# Patient Record
Sex: MALE | Race: WHITE | NOT HISPANIC OR LATINO | Employment: FULL TIME | URBAN - METROPOLITAN AREA
[De-identification: names, ages, dates, MRNs, and addresses within clinical notes are randomized per-mention and may not be internally consistent; named-entity substitution may affect disease eponyms.]

---

## 2017-02-23 ENCOUNTER — GENERIC CONVERSION - ENCOUNTER (OUTPATIENT)
Dept: OTHER | Facility: OTHER | Age: 47
End: 2017-02-23

## 2017-02-24 RX ORDER — ACETAMINOPHEN 325 MG/1
975 TABLET ORAL ONCE
Status: DISCONTINUED | OUTPATIENT
Start: 2017-02-27 | End: 2017-03-02 | Stop reason: HOSPADM

## 2017-02-24 RX ORDER — SODIUM CHLORIDE 9 MG/ML
20 INJECTION, SOLUTION INTRAVENOUS CONTINUOUS
Status: DISCONTINUED | OUTPATIENT
Start: 2017-02-27 | End: 2017-03-02 | Stop reason: HOSPADM

## 2017-02-24 RX ORDER — DIPHENHYDRAMINE HCL 25 MG
25 TABLET ORAL ONCE
Status: DISCONTINUED | OUTPATIENT
Start: 2017-02-27 | End: 2017-03-02 | Stop reason: HOSPADM

## 2017-02-27 ENCOUNTER — HOSPITAL ENCOUNTER (OUTPATIENT)
Dept: INFUSION CENTER | Facility: CLINIC | Age: 47
Discharge: HOME | End: 2017-02-27
Payer: COMMERCIAL

## 2017-02-27 VITALS
HEART RATE: 80 BPM | SYSTOLIC BLOOD PRESSURE: 130 MMHG | TEMPERATURE: 96.5 F | DIASTOLIC BLOOD PRESSURE: 90 MMHG | RESPIRATION RATE: 18 BRPM

## 2017-02-27 PROCEDURE — 96413 CHEMO IV INFUSION 1 HR: CPT

## 2017-02-27 RX ADMIN — SODIUM CHLORIDE 20 ML/HR: 0.9 INJECTION, SOLUTION INTRAVENOUS at 09:05

## 2017-02-27 NOTE — PROGRESS NOTES
Patient tolerated Entyvio without complications  Next appointment made before d/c   Patient declined AVS

## 2017-03-07 ENCOUNTER — ALLSCRIPTS OFFICE VISIT (OUTPATIENT)
Dept: OTHER | Facility: OTHER | Age: 47
End: 2017-03-07

## 2017-03-07 DIAGNOSIS — K51.30 CHRONIC ULCERATIVE RECTOSIGMOIDITIS WITHOUT COMPLICATIONS (HCC): ICD-10-CM

## 2017-04-21 RX ORDER — SODIUM CHLORIDE 9 MG/ML
20 INJECTION, SOLUTION INTRAVENOUS CONTINUOUS
Status: DISCONTINUED | OUTPATIENT
Start: 2017-04-24 | End: 2017-04-27 | Stop reason: HOSPADM

## 2017-04-21 RX ORDER — ACETAMINOPHEN 325 MG/1
975 TABLET ORAL ONCE
Status: DISCONTINUED | OUTPATIENT
Start: 2017-04-24 | End: 2017-04-27 | Stop reason: HOSPADM

## 2017-04-21 RX ORDER — DIPHENHYDRAMINE HCL 25 MG
25 TABLET ORAL ONCE
Status: DISCONTINUED | OUTPATIENT
Start: 2017-04-24 | End: 2017-04-27 | Stop reason: HOSPADM

## 2017-04-24 ENCOUNTER — HOSPITAL ENCOUNTER (OUTPATIENT)
Dept: INFUSION CENTER | Facility: CLINIC | Age: 47
Discharge: HOME | End: 2017-04-24
Payer: COMMERCIAL

## 2017-04-24 VITALS — HEART RATE: 72 BPM | SYSTOLIC BLOOD PRESSURE: 137 MMHG | DIASTOLIC BLOOD PRESSURE: 80 MMHG

## 2017-04-24 PROCEDURE — 96413 CHEMO IV INFUSION 1 HR: CPT

## 2017-04-24 RX ADMIN — SODIUM CHLORIDE 20 ML/HR: 0.9 INJECTION, SOLUTION INTRAVENOUS at 09:48

## 2017-04-24 NOTE — PLAN OF CARE

## 2017-06-09 ENCOUNTER — GENERIC CONVERSION - ENCOUNTER (OUTPATIENT)
Dept: OTHER | Facility: OTHER | Age: 47
End: 2017-06-09

## 2017-06-16 RX ORDER — ACETAMINOPHEN 325 MG/1
975 TABLET ORAL ONCE
Status: DISCONTINUED | OUTPATIENT
Start: 2017-06-19 | End: 2017-06-22 | Stop reason: HOSPADM

## 2017-06-16 RX ORDER — SODIUM CHLORIDE 9 MG/ML
20 INJECTION, SOLUTION INTRAVENOUS CONTINUOUS
Status: DISCONTINUED | OUTPATIENT
Start: 2017-06-19 | End: 2017-06-22 | Stop reason: HOSPADM

## 2017-06-16 RX ORDER — DIPHENHYDRAMINE HCL 25 MG
25 TABLET ORAL ONCE
Status: DISCONTINUED | OUTPATIENT
Start: 2017-06-19 | End: 2017-06-22 | Stop reason: HOSPADM

## 2017-06-19 ENCOUNTER — HOSPITAL ENCOUNTER (OUTPATIENT)
Dept: INFUSION CENTER | Facility: CLINIC | Age: 47
Discharge: HOME | End: 2017-06-19
Payer: COMMERCIAL

## 2017-06-19 VITALS
SYSTOLIC BLOOD PRESSURE: 129 MMHG | DIASTOLIC BLOOD PRESSURE: 79 MMHG | RESPIRATION RATE: 18 BRPM | HEART RATE: 74 BPM | TEMPERATURE: 97.6 F

## 2017-06-19 PROCEDURE — 96413 CHEMO IV INFUSION 1 HR: CPT

## 2017-06-19 RX ADMIN — SODIUM CHLORIDE 20 ML/HR: 0.9 INJECTION, SOLUTION INTRAVENOUS at 10:14

## 2017-06-19 NOTE — PLAN OF CARE
Problem: Potential for Falls  Goal: Patient will remain free of falls  INTERVENTIONS:  - Assess patient frequently for physical needs  -  Identify cognitive and physical deficits and behaviors that affect risk of falls    -  [place] fall precautions as indicated by assessment   - Educate patient/family on patient safety including physical limitations  - Instruct patient to call for assistance with activity based on assessment  - Modify environment to reduce risk of injury  - Consider OT/PT consult to assist with strengthening/mobility   Outcome: Progressing

## 2017-08-11 RX ORDER — DIPHENHYDRAMINE HCL 25 MG
25 TABLET ORAL ONCE
Status: DISCONTINUED | OUTPATIENT
Start: 2017-08-14 | End: 2017-08-17 | Stop reason: HOSPADM

## 2017-08-11 RX ORDER — ACETAMINOPHEN 325 MG/1
975 TABLET ORAL ONCE
Status: DISCONTINUED | OUTPATIENT
Start: 2017-08-14 | End: 2017-08-17 | Stop reason: HOSPADM

## 2017-08-11 RX ORDER — SODIUM CHLORIDE 9 MG/ML
20 INJECTION, SOLUTION INTRAVENOUS CONTINUOUS
Status: DISCONTINUED | OUTPATIENT
Start: 2017-08-14 | End: 2017-08-17 | Stop reason: HOSPADM

## 2017-08-14 ENCOUNTER — HOSPITAL ENCOUNTER (OUTPATIENT)
Dept: INFUSION CENTER | Facility: CLINIC | Age: 47
Discharge: HOME | End: 2017-08-14
Payer: COMMERCIAL

## 2017-08-14 VITALS
HEART RATE: 73 BPM | DIASTOLIC BLOOD PRESSURE: 87 MMHG | TEMPERATURE: 97.1 F | RESPIRATION RATE: 16 BRPM | SYSTOLIC BLOOD PRESSURE: 123 MMHG

## 2017-08-14 PROCEDURE — 96413 CHEMO IV INFUSION 1 HR: CPT

## 2017-08-14 RX ADMIN — SODIUM CHLORIDE 20 ML/HR: 0.9 INJECTION, SOLUTION INTRAVENOUS at 10:00

## 2017-08-14 NOTE — PROGRESS NOTES
Pt comes to infusion center for entyvio infusion  Pt tolerated infusion well   Pt aware of future appointments

## 2017-09-08 ENCOUNTER — ALLSCRIPTS OFFICE VISIT (OUTPATIENT)
Dept: OTHER | Facility: OTHER | Age: 47
End: 2017-09-08

## 2017-09-11 ENCOUNTER — HOSPITAL ENCOUNTER (OUTPATIENT)
Facility: HOSPITAL | Age: 47
Setting detail: OUTPATIENT SURGERY
Discharge: HOME | End: 2017-09-11
Attending: INTERNAL MEDICINE | Admitting: INTERNAL MEDICINE
Payer: COMMERCIAL

## 2017-09-11 ENCOUNTER — GENERIC CONVERSION - ENCOUNTER (OUTPATIENT)
Dept: OTHER | Facility: OTHER | Age: 47
End: 2017-09-11

## 2017-09-11 ENCOUNTER — ANESTHESIA EVENT (OUTPATIENT)
Dept: GASTROENTEROLOGY | Facility: HOSPITAL | Age: 47
End: 2017-09-11
Payer: COMMERCIAL

## 2017-09-11 ENCOUNTER — ANESTHESIA (OUTPATIENT)
Dept: GASTROENTEROLOGY | Facility: HOSPITAL | Age: 47
End: 2017-09-11
Payer: COMMERCIAL

## 2017-09-11 VITALS
HEIGHT: 70 IN | BODY MASS INDEX: 37.94 KG/M2 | HEART RATE: 81 BPM | RESPIRATION RATE: 16 BRPM | OXYGEN SATURATION: 99 % | WEIGHT: 265 LBS | DIASTOLIC BLOOD PRESSURE: 61 MMHG | TEMPERATURE: 97.5 F | SYSTOLIC BLOOD PRESSURE: 114 MMHG

## 2017-09-11 PROCEDURE — 88305 TISSUE EXAM BY PATHOLOGIST: CPT | Performed by: INTERNAL MEDICINE

## 2017-09-11 PROCEDURE — 88342 IMHCHEM/IMCYTCHM 1ST ANTB: CPT | Performed by: INTERNAL MEDICINE

## 2017-09-11 RX ORDER — PROPOFOL 10 MG/ML
INJECTION, EMULSION INTRAVENOUS AS NEEDED
Status: DISCONTINUED | OUTPATIENT
Start: 2017-09-11 | End: 2017-09-11 | Stop reason: SURG

## 2017-09-11 RX ORDER — SODIUM CHLORIDE 9 MG/ML
50 INJECTION, SOLUTION INTRAVENOUS CONTINUOUS
Status: DISCONTINUED | OUTPATIENT
Start: 2017-09-11 | End: 2017-09-11 | Stop reason: HOSPADM

## 2017-09-11 RX ORDER — PROPOFOL 10 MG/ML
INJECTION, EMULSION INTRAVENOUS CONTINUOUS PRN
Status: DISCONTINUED | OUTPATIENT
Start: 2017-09-11 | End: 2017-09-11 | Stop reason: SURG

## 2017-09-11 RX ADMIN — PROPOFOL 100 MG: 10 INJECTION, EMULSION INTRAVENOUS at 13:36

## 2017-09-11 RX ADMIN — PROPOFOL 100 MG: 10 INJECTION, EMULSION INTRAVENOUS at 13:40

## 2017-09-11 RX ADMIN — SODIUM CHLORIDE 50 ML/HR: 0.9 INJECTION, SOLUTION INTRAVENOUS at 13:31

## 2017-09-11 RX ADMIN — PROPOFOL 100 MG: 10 INJECTION, EMULSION INTRAVENOUS at 13:45

## 2017-09-11 RX ADMIN — PROPOFOL 150 MCG/KG/MIN: 10 INJECTION, EMULSION INTRAVENOUS at 13:37

## 2017-09-11 RX ADMIN — PROPOFOL 100 MG: 10 INJECTION, EMULSION INTRAVENOUS at 13:57

## 2017-09-13 ENCOUNTER — GENERIC CONVERSION - ENCOUNTER (OUTPATIENT)
Dept: OTHER | Facility: OTHER | Age: 47
End: 2017-09-13

## 2017-10-02 ENCOUNTER — GENERIC CONVERSION - ENCOUNTER (OUTPATIENT)
Dept: OTHER | Facility: OTHER | Age: 47
End: 2017-10-02

## 2017-10-06 RX ORDER — ACETAMINOPHEN 325 MG/1
975 TABLET ORAL ONCE
Status: DISCONTINUED | OUTPATIENT
Start: 2017-10-09 | End: 2017-10-12 | Stop reason: HOSPADM

## 2017-10-06 RX ORDER — DIPHENHYDRAMINE HCL 25 MG
25 TABLET ORAL ONCE
Status: DISCONTINUED | OUTPATIENT
Start: 2017-10-09 | End: 2017-10-12 | Stop reason: HOSPADM

## 2017-10-06 RX ORDER — SODIUM CHLORIDE 9 MG/ML
20 INJECTION, SOLUTION INTRAVENOUS CONTINUOUS
Status: DISCONTINUED | OUTPATIENT
Start: 2017-10-09 | End: 2017-10-12 | Stop reason: HOSPADM

## 2017-10-09 ENCOUNTER — HOSPITAL ENCOUNTER (OUTPATIENT)
Dept: INFUSION CENTER | Facility: CLINIC | Age: 47
Discharge: HOME | End: 2017-10-09
Payer: COMMERCIAL

## 2017-10-10 RX ORDER — ACETAMINOPHEN 325 MG/1
975 TABLET ORAL ONCE
Status: DISCONTINUED | OUTPATIENT
Start: 2017-10-11 | End: 2017-10-14 | Stop reason: HOSPADM

## 2017-10-10 RX ORDER — DIPHENHYDRAMINE HCL 25 MG
25 TABLET ORAL ONCE
Status: DISCONTINUED | OUTPATIENT
Start: 2017-10-11 | End: 2017-10-14 | Stop reason: HOSPADM

## 2017-10-11 ENCOUNTER — HOSPITAL ENCOUNTER (OUTPATIENT)
Dept: INFUSION CENTER | Facility: CLINIC | Age: 47
Discharge: HOME | End: 2017-10-11
Payer: COMMERCIAL

## 2017-10-11 VITALS
TEMPERATURE: 96.7 F | HEART RATE: 85 BPM | RESPIRATION RATE: 18 BRPM | DIASTOLIC BLOOD PRESSURE: 80 MMHG | SYSTOLIC BLOOD PRESSURE: 149 MMHG

## 2017-10-11 PROCEDURE — 96413 CHEMO IV INFUSION 1 HR: CPT

## 2017-10-11 NOTE — PROGRESS NOTES
Pt received treatment without complications  Discharged in stable condition  Next appointment scheduled in 8 weeks  AVS provided

## 2017-10-11 NOTE — PLAN OF CARE
Problem: Potential for Falls  Goal: Patient will remain free of falls  INTERVENTIONS:  - Assess patient frequently for physical needs  -  Identify cognitive and physical deficits and behaviors that affect risk of falls    -  [place] fall precautions as indicated by assessment   - Educate patient/family on patient safety including physical limitations  - Instruct patient to call for assistance with activity based on assessment  - Modify environment to reduce risk of injury  - Consider OT/PT consult to assist with strengthening/mobility   Outcome: Progressing

## 2017-12-06 ENCOUNTER — HOSPITAL ENCOUNTER (OUTPATIENT)
Dept: INFUSION CENTER | Facility: CLINIC | Age: 47
Discharge: HOME | End: 2017-12-06
Payer: COMMERCIAL

## 2017-12-06 VITALS
RESPIRATION RATE: 18 BRPM | HEART RATE: 86 BPM | DIASTOLIC BLOOD PRESSURE: 83 MMHG | TEMPERATURE: 97.8 F | SYSTOLIC BLOOD PRESSURE: 147 MMHG

## 2017-12-06 PROCEDURE — 96375 TX/PRO/DX INJ NEW DRUG ADDON: CPT

## 2017-12-06 PROCEDURE — 96413 CHEMO IV INFUSION 1 HR: CPT

## 2017-12-06 RX ORDER — DIPHENHYDRAMINE HCL 25 MG
25 TABLET ORAL ONCE
Status: DISCONTINUED | OUTPATIENT
Start: 2017-12-06 | End: 2017-12-09 | Stop reason: HOSPADM

## 2017-12-06 RX ORDER — METHYLPREDNISOLONE SODIUM SUCCINATE 40 MG/ML
40 INJECTION, POWDER, LYOPHILIZED, FOR SOLUTION INTRAMUSCULAR; INTRAVENOUS ONCE
Status: COMPLETED | OUTPATIENT
Start: 2017-12-06 | End: 2017-12-06

## 2017-12-06 RX ORDER — SODIUM CHLORIDE 9 MG/ML
20 INJECTION, SOLUTION INTRAVENOUS CONTINUOUS
Status: DISCONTINUED | OUTPATIENT
Start: 2017-12-06 | End: 2017-12-09 | Stop reason: HOSPADM

## 2017-12-06 RX ORDER — ACETAMINOPHEN 325 MG/1
975 TABLET ORAL ONCE
Status: DISCONTINUED | OUTPATIENT
Start: 2017-12-06 | End: 2017-12-09 | Stop reason: HOSPADM

## 2017-12-06 RX ADMIN — SODIUM CHLORIDE 20 ML/HR: 0.9 INJECTION, SOLUTION INTRAVENOUS at 14:43

## 2017-12-06 RX ADMIN — METHYLPREDNISOLONE SODIUM SUCCINATE 40 MG: 40 INJECTION, POWDER, FOR SOLUTION INTRAMUSCULAR; INTRAVENOUS at 14:46

## 2017-12-06 NOTE — PLAN OF CARE
Problem: Potential for Falls  Goal: Patient will remain free of falls  INTERVENTIONS:  - Assess patient frequently for physical needs  -  Identify cognitive and physical deficits and behaviors that affect risk of falls    -  [place] fall precautions as indicated by assessment   - Educate patient/family on patient safety including physical limitations  - Instruct patient to call for assistance with activity based on assessment  - Modify environment to reduce risk of injury  - Consider OT/PT consult to assist with strengthening/mobility   Outcome: Progressing

## 2017-12-06 NOTE — PROGRESS NOTES
Patient denied any recent illnesses, surgeries, hospitalizations  Patient tolerated treatment well without any complications  Next appt made and AVS given

## 2017-12-19 ENCOUNTER — GENERIC CONVERSION - ENCOUNTER (OUTPATIENT)
Dept: OTHER | Facility: OTHER | Age: 47
End: 2017-12-19

## 2018-01-05 ENCOUNTER — GENERIC CONVERSION - ENCOUNTER (OUTPATIENT)
Dept: GASTROENTEROLOGY | Facility: CLINIC | Age: 48
End: 2018-01-05

## 2018-01-05 ENCOUNTER — GENERIC CONVERSION - ENCOUNTER (OUTPATIENT)
Dept: OTHER | Facility: OTHER | Age: 48
End: 2018-01-05

## 2018-01-09 ENCOUNTER — GENERIC CONVERSION - ENCOUNTER (OUTPATIENT)
Dept: GASTROENTEROLOGY | Facility: CLINIC | Age: 48
End: 2018-01-09

## 2018-01-11 NOTE — RESULT NOTES
Verified Results  (1) TISSUE EXAM 04Apr2016 01:21PM Deloris Herbert     Test Name Result Flag Reference   LAB AP CASE REPORT (Report)     Surgical Pathology Report             Case: [id number]                   Authorizing Provider: Yinka Mcneil PA-C    Collected:      04/04/2016 1321        Ordering Location:   [facility]    Received:      04/04/2016 [address]  Endoscopy                              Pathologist:      Karli Murillo MD                                Specimens:  A) - Small Bowel, NOS, random bx terminal ileum  pt has ulcerative colitis               B) - Large Intestine, Right/Ascending Colon, bx ascending colon  Pt has ulcerative           colitis                                                C) - Rectum, rectal bx   LAB AP FINAL DIAGNOSIS (Report)     A  Small Bowel, NOS, random bx terminal ileum  biopsy:   -Benign small intestinal mucosa with intact villi and intramucosal   lymphoid follicle formation and mild chronic inflammation  -No active inflammation or granuloma seen   -No evidence of dysplasia or malignancy  B  Large Intestine, Right/Ascending Colon, bx ascending colon  Pt has   ulcerative colitis:  -Benign colonic mucosa with edema of lamina propria and focal crypt   architecture distortion/branching    -No evidence of active colitis    -No evidence of dysplasia or malignancy seen    C  Rectum, rectal biopsy:    -Chronic severe active proctitis, with cryptitis, crypt abscess, crypt   architecture distortion/ branching and mucosal ulceration  -No evidence of dysplasia or malignancy seen  LAB AP SURGICAL ADDITIONAL INFORMATION (Report)     These tests were developed and their performance characteristics   determined by [address] [place] [address] [facility] or [facility]  They may not be cleared or approved by the [place]  Food and   Drug Administration  The FDA has determined that such clearance or   approval is not necessary   These tests are used for clinical purposes  They should not be regarded as investigational or for research  This   laboratory has been approved by IA 88, designated as a high-complexity   laboratory and is qualified to perform these tests  LAB AP GROSS DESCRIPTION (Report)     A  The specimen is received in formalin, labeled with the patient's name   and hospital number, and is designated random biopsy terminal ileum  The specimen consists of 2 tan soft tissue fragments measuring 0 3 cm   each  Entirely submitted  One cassette  B  The specimen is received in formalin, labeled with the patient's name   and hospital number, and is designated biopsy of ascending colon  The   specimen consists of 4 tan soft tissue fragments each measuring 0 3 cm  Entirely submitted  One cassette  Note: The estimated total formalin fixation time based upon information   provided by the submitting clinician and the standard processing schedule   is 24 75 hours  C  The specimen is received in formalin, labeled with the patient's name   and hospital number, and is designated rectal biopsy  The specimen   consists of 3 tan soft tissue fragments each measuring 0 2-0 3 cm  Entirely submitted  One cassette  Note: The estimated total formalin fixation time based upon information   provided by the submitting clinician and the standard processing schedule   is 24 5 hours      MAC

## 2018-01-11 NOTE — MISCELLANEOUS
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)    Current Meds   1  Entyvio 300 MG Intravenous Solution Reconstituted; 300mg IV on weeks 0,2,6 then q 8   weeks; Therapy: 29Odl6314 to (Last Rx:12Qzr9488) Ordered   2  Entyvio 300 MG Intravenous Solution Reconstituted; Use as directed with infusions at 0,2,   and 6 weeks, and then once every 8 weeks after that; Therapy: 24ZDF9031 to (Last Rx:56Bzg9282)  Requested for: 22SYE8894 Ordered   3  Hydrocortisone 100 MG/60ML Rectal Enema (Cortenema); TAKE RECTALLY ONCE   DAILY AT BEDTIME; Therapy: 64Egd7017 to (Last Rx:80Pdj0184)  Requested for: 51Iuz6506 Ordered   4  Lialda 1 2 GM Oral Tablet Delayed Release; TAKE 4 TABLETS ONCE DAILY WITH THE   EVENING MEAL; Therapy: 82Iri9128 to (Evaluate:06Apr2017); Last Rx:95Hag4787 Ordered   5  PredniSONE 10 MG Oral Tablet; TAKE 4 TABLETS DAILY; Therapy: 62Eyy1340 to (Evaluate:10Jun2016)  Requested for: 82Jtl9336; Last   Rx:82Tqm6485 Ordered   6  PredniSONE 5 MG Oral Tablet; Therapy: (Recorded:30Mos4963) to Recorded    Allergies    1   No Known Drug Allergies    Signatures   Electronically signed by : Rosario Lopez, ; May 23 2016  1:27PM EST                       (Author)

## 2018-01-13 VITALS
BODY MASS INDEX: 36.68 KG/M2 | HEART RATE: 88 BPM | DIASTOLIC BLOOD PRESSURE: 76 MMHG | WEIGHT: 262 LBS | SYSTOLIC BLOOD PRESSURE: 142 MMHG | HEIGHT: 71 IN | TEMPERATURE: 95.7 F | OXYGEN SATURATION: 97 %

## 2018-01-13 VITALS
OXYGEN SATURATION: 97 % | BODY MASS INDEX: 37.1 KG/M2 | WEIGHT: 265 LBS | DIASTOLIC BLOOD PRESSURE: 68 MMHG | HEART RATE: 79 BPM | TEMPERATURE: 96.2 F | SYSTOLIC BLOOD PRESSURE: 118 MMHG | HEIGHT: 71 IN

## 2018-01-16 NOTE — RESULT NOTES
Discussion/Summary   Biopsies from stomach, duodenum, terminal ileum, and ascending colon were normal  Biopsies from the rectum were same as before (moderate to severe ulcerative colitis)  Verified Results  (1) TISSUE EXAM 11Sep2017 01:46PM Julian Her     Test Name Result Flag Reference   LAB AP CASE REPORT (Report)     Surgical Pathology Report             Case: [id number]                   Authorizing Provider: Vel Malik MD      Collected:      09/11/2017 1346        Ordering Location:   [address] [place] [address]   Received:      09/11/2017 [address] [facility] Endoscopy                               Pathologist:      Che Escoto DO                               Specimens:  A) - Duodenum, R/O celiac                                       B) - Stomach, r/o H pylori                                       C) - Intestine, Random terminal ileum  evaluation of UC                        D) - Large Intestine, Right/Ascending Colon, Evaluation of UC  Normal appearance            E) - Rectum, Ulcerative colitis   LAB AP FINAL DIAGNOSIS (Report)     A  Duodenum, biopsy:  - Duodenal mucosa with no significant pathologic abnormalities  - No villous atrophy or increased intraepithelial lymphocytes identified  B  Stomach, biopsy:  - Minimal chronic inactive antral and oxyntic gastritis  - No H  pylori organisms identified on H&E and immunohistochemical stains  C  Terminal ileum, random, biopsy:  - Ileal mucosa with no significant pathologic abnormality   - No granulomata or dysplasia identified  D  Colon, ascending, biopsy:  - Benign colonic mucosa with submucosal lymphoid follicle  - No changes of chronic, active or microscopic colitis identified    - No dysplasia identified  E  Rectum, ulcerative colitis, biopsy:  - Chronic proctitis with moderate to severe activity, with ulceration, see   note      Note: Ulcerated fragments of acutely inflamed colonic mucosa with acute   cryptitis, acute crypt abscess, increased plasmacytic and neutrophilic   infiltrate of the lamina propria and mild to moderate glandular disarray  Separate fragments of acutely inflamed granulation tissue present  No   granulomata or dysplasia identified  Appropriate positive and negative controls obtained  Interpretation performed at [facility], [place] [address]  Electronically signed by Queta Uribe DO on 9/12/2017 at 1:33 PM   LAB AP SURGICAL ADDITIONAL INFORMATION (Report)     All controls performed with the immunohistochemical stains reported above   reacted appropriately  These tests were developed and their performance   characteristics determined by [facility] or   [facility]  They may not be cleared or approved by the [place]  Food and Drug Administration  The FDA has determined that such clearance   or approval is not necessary  These tests are used for clinical purposes  They should not be regarded as investigational or for research  This   laboratory has been approved by CLIA 88, designated as a high-complexity   laboratory and is qualified to perform these tests  LAB AP GROSS DESCRIPTION (Report)     A  The specimen is received in formalin, labeled with the patient's name   and hospital number, and is designated duodenum rule out celiac  The   specimen consists of two, rubbery, tan-brown tissue fragments each   measuring up to 0 3 cm in greatest dimension  Entirely submitted  One   cassette  B  The specimen is received in formalin, labeled with the patient's name   and hospital number, and is designated stomach rule out H  pylori  The   specimen consists of two, rubbery, tan-brown tissue fragments each   measuring up to 0 3 cm in greatest dimension  Entirely submitted  One   cassette  C  The specimen is received in formalin, labeled with the patient's name   and hospital number, and is designated random terminal ileum, evaluation   of UC  The specimen consists of a single, rubbery, tan-brown tissue   fragment measuring up to 0 4 cm in greatest dimension  Entirely submitted  One cassette  D  The specimen is received in formalin, labeled with the patient's name   and hospital number, and is designated Ascending colon evaluation of   UC  The specimen consists of two, rubbery, tan-brown tissue fragments   each measuring up to 0 3 cm in greatest dimension  Entirely submitted  One   cassette  E  The specimen is received in formalin, labeled with the patient's name   and hospital number, and is designated rectum ulcerative colitis  The   specimen consists of several, rubbery, tan-brown tissue fragments   measuring 1 2 x 0 9 x 0 1 cm in aggregate dimension  Entirely submitted  One cassette  Note: The estimated total formalin fixation time based upon information   provided by the submitting clinician and the standard processing schedule   is less than 72 hours    Jose Donnelly

## 2018-01-17 NOTE — MISCELLANEOUS
Message  GI Reminder Recall [facility]:   Date: 06/09/2017   Dear Toni Lamas:     Review of our records shows you are due for the following: Follow Up Visit  Please call the following office to schedule your appointment:   [address], [address], [place], [place], [address] [facility] [phone]  We look forward to hearing from you! Sincerely,     GI Team      Signatures   Electronically signed by :  Trixie Karimi, ; Jun 9 2017  9:37AM EST                       (Author)

## 2018-01-22 VITALS
BODY MASS INDEX: 37.66 KG/M2 | HEART RATE: 74 BPM | SYSTOLIC BLOOD PRESSURE: 120 MMHG | OXYGEN SATURATION: 97 % | WEIGHT: 269 LBS | HEIGHT: 71 IN | TEMPERATURE: 97.3 F | DIASTOLIC BLOOD PRESSURE: 80 MMHG

## 2018-01-24 ENCOUNTER — TELEPHONE (OUTPATIENT)
Dept: GASTROENTEROLOGY | Facility: MEDICAL CENTER | Age: 48
End: 2018-01-24

## 2018-01-26 ENCOUNTER — TELEPHONE (OUTPATIENT)
Dept: GASTROENTEROLOGY | Facility: CLINIC | Age: 48
End: 2018-01-26

## 2018-01-29 ENCOUNTER — TELEPHONE (OUTPATIENT)
Dept: GASTROENTEROLOGY | Facility: MEDICAL CENTER | Age: 48
End: 2018-01-29

## 2018-01-29 DIAGNOSIS — K51.319 ULCERATIVE PROCTOSIGMOIDITIS, UNSPECIFIED COMPLICATION (HCC): Primary | ICD-10-CM

## 2018-01-29 RX ORDER — VEDOLIZUMAB 300 MG/5ML
INJECTION, POWDER, LYOPHILIZED, FOR SOLUTION INTRAVENOUS
Qty: 1 EACH | Refills: 5 | Status: SHIPPED | OUTPATIENT
Start: 2018-01-29 | End: 2018-02-02 | Stop reason: DRUGHIGH

## 2018-01-29 NOTE — TELEPHONE ENCOUNTER
I have a written physician order at the [facility] office for Dr Keyonna Albrecht to sign, once he signs it I will send it to the [facility] [phone]

## 2018-01-29 NOTE — TELEPHONE ENCOUNTER
Called [phone], provided a verbal for Ozzy with Josiane the pharmacist  She marked the medication as urgent to ship

## 2018-01-30 RX ORDER — DIPHENHYDRAMINE HCL 25 MG
25 TABLET ORAL ONCE
Status: DISCONTINUED | OUTPATIENT
Start: 2018-01-31 | End: 2018-02-03 | Stop reason: HOSPADM

## 2018-01-30 RX ORDER — SODIUM CHLORIDE 9 MG/ML
20 INJECTION, SOLUTION INTRAVENOUS CONTINUOUS
Status: DISCONTINUED | OUTPATIENT
Start: 2018-01-31 | End: 2018-02-03 | Stop reason: HOSPADM

## 2018-01-30 RX ORDER — ACETAMINOPHEN 325 MG/1
975 TABLET ORAL ONCE
Status: DISCONTINUED | OUTPATIENT
Start: 2018-01-31 | End: 2018-02-03 | Stop reason: HOSPADM

## 2018-01-30 RX ORDER — METHYLPREDNISOLONE SODIUM SUCCINATE 40 MG/ML
40 INJECTION, POWDER, LYOPHILIZED, FOR SOLUTION INTRAMUSCULAR; INTRAVENOUS ONCE
Status: COMPLETED | OUTPATIENT
Start: 2018-01-31 | End: 2018-01-31

## 2018-01-31 ENCOUNTER — HOSPITAL ENCOUNTER (OUTPATIENT)
Dept: INFUSION CENTER | Facility: CLINIC | Age: 48
Discharge: HOME | End: 2018-01-31
Payer: COMMERCIAL

## 2018-01-31 ENCOUNTER — OFFICE VISIT (OUTPATIENT)
Dept: GASTROENTEROLOGY | Facility: MEDICAL CENTER | Age: 48
End: 2018-01-31
Payer: COMMERCIAL

## 2018-01-31 VITALS
TEMPERATURE: 96 F | HEART RATE: 83 BPM | WEIGHT: 275 LBS | DIASTOLIC BLOOD PRESSURE: 84 MMHG | SYSTOLIC BLOOD PRESSURE: 122 MMHG | BODY MASS INDEX: 39.37 KG/M2 | HEIGHT: 70 IN

## 2018-01-31 VITALS
RESPIRATION RATE: 18 BRPM | HEART RATE: 92 BPM | DIASTOLIC BLOOD PRESSURE: 80 MMHG | SYSTOLIC BLOOD PRESSURE: 135 MMHG | TEMPERATURE: 96.2 F

## 2018-01-31 DIAGNOSIS — K52.9 CHRONIC DIARRHEA: ICD-10-CM

## 2018-01-31 DIAGNOSIS — K51.311 ULCERATIVE RECTOSIGMOIDITIS WITH RECTAL BLEEDING (HCC): Primary | ICD-10-CM

## 2018-01-31 PROCEDURE — 96413 CHEMO IV INFUSION 1 HR: CPT

## 2018-01-31 PROCEDURE — 99214 OFFICE O/P EST MOD 30 MIN: CPT | Performed by: INTERNAL MEDICINE

## 2018-01-31 PROCEDURE — 96375 TX/PRO/DX INJ NEW DRUG ADDON: CPT

## 2018-01-31 RX ADMIN — SODIUM CHLORIDE 20 ML/HR: 0.9 INJECTION, SOLUTION INTRAVENOUS at 14:19

## 2018-01-31 RX ADMIN — METHYLPREDNISOLONE SODIUM SUCCINATE 40 MG: 40 INJECTION, POWDER, FOR SOLUTION INTRAMUSCULAR; INTRAVENOUS at 14:21

## 2018-01-31 NOTE — PROGRESS NOTES
Patient arrived without any complaints  Tolerated treatment well without any complications  Confirmed next appt and AVS given

## 2018-01-31 NOTE — PROGRESS NOTES
[facility] - Outpatient Consultation  Brenden Molina 56 y o  male MRN: [id number]  Encounter: [id number]          ASSESSMENT AND PLAN:      1  Ulcerative rectosigmoiditis with rectal bleeding (HCC)  I will change his Entyvio infusions to once every six weeks  I have asked him to continue using the Cortifoam twice a day and we will refill his prescription  I will have him follow up in the office in two months to reassess his symptoms and I will check his labs today including a CBC, comprehensive metabolic panel, and C-reactive protein  2  Chronic diarrhea  His diarrhea is likely due to his ulcerative proctosigmoiditis  I will see if his symptoms improve on the shorter interval of an tibial effusions and the Cortifoam twice a day  He can also take Imodium as needed for possibly overlying diarrhea predominant irritable bowel syndrome  ______________________________________________________________________    HPI:  He presents for follow-up of his ulcerative proctosigmoiditis  He has been having diarrhea symptoms and occasional incontinence despite taking into view every eight weeks  He he does not feel he can tolerate the enemas or the foam very well  He denies any abdominal pain, nausea, vomiting, bleeding, or weight loss  REVIEW OF SYSTEMS:    CONSTITUTIONAL: Denies any fever, chills, rigors, and weight loss, but complains of fatigue  HEENT: No earache or tinnitus  Denies hearing loss or visual disturbances  CARDIOVASCULAR: No chest pain or palpitations  RESPIRATORY: Denies any cough, hemoptysis, shortness of breath or dyspnea on exertion  GASTROINTESTINAL: As noted in the History of Present Illness  GENITOURINARY: No problems with urination  Denies any hematuria or dysuria  NEUROLOGIC: No dizziness or vertigo, denies headaches  MUSCULOSKELETAL: Denies any muscle or joint pain  SKIN: Denies skin rashes or itching     ENDOCRINE: Denies excessive thirst  Denies intolerance to heat or cold  PSYCHOSOCIAL: Denies depression or anxiety  Denies any recent memory loss  Historical Information   Past Medical History:   Diagnosis Date    Anxiety     Ulcerative colitis [facility])      Past Surgical History:   Procedure Laterality Date    COLONOSCOPY N/A 4/4/2016    Procedure: COLONOSCOPY;  Surgeon: Angel Macias MD;  Location: [facility]; Service:     EGD AND COLONOSCOPY N/A 9/11/2017    Procedure: EGD AND COLONOSCOPY;  Surgeon: Angel Macias MD;  Location:  GI LAB; Service: Gastroenterology    HERNIA REPAIR      as infant     Social History   History   Alcohol Use    Yes     Comment: occationally     History   Drug Use No     History   Smoking Status    Former Smoker   Smokeless Tobacco    Never Used     No family history on file  Meds/Allergies       Current Outpatient Prescriptions:     ENTYVIO 300 MG SOLR    Mesalamine (LIALDA PO)    sertraline (ZOLOFT) 50 mg tablet    Vedolizumab (ENTYVIO IV)  No current facility-administered medications for this visit  Facility-Administered Medications Ordered in Other Visits:     acetaminophen (TYLENOL) tablet 975 mg, 975 mg, Oral, Once    diphenhydrAMINE (BENADRYL) tablet 25 mg, 25 mg, Oral, Once    methylPREDNISolone sodium succinate (Solu-MEDROL) injection 40 mg, 40 mg, Intravenous, Once    sodium chloride 0 9 % infusion, 20 mL/hr, Intravenous, Continuous    vedolizumab (ENTYVIO) 300 mg in sodium chloride 0 9 % 250 mL IVPB, 300 mg, Intravenous, Once    No Known Allergies        Objective     Blood pressure 122/84, pulse 83, temperature (!) 96 °F (35 6 °C), temperature source Tympanic, height 5' 10" (1 778 m), weight 125 kg (275 lb)          PHYSICAL EXAM:      General Appearance:   Alert, cooperative, no distress   HEENT:   Normocephalic, atraumatic, anicteric      Neck:  Supple, symmetrical, trachea midline   Lungs:   Clear to auscultation bilaterally; no rales, rhonchi or wheezing; respirations unlabored  Heart[de-identified]   Regular rate and rhythm; no murmur, rub, or gallop  Abdomen:   Soft, obese, non-tender, non-distended; normal bowel sounds; no masses, no organomegaly    Genitalia:   Deferred    Rectal:   Deferred    Extremities:  No cyanosis, clubbing or edema    Pulses:  2+ and symmetric all extremities    Skin:  No jaundice, rashes, or lesions    Lymph nodes:  No palpable cervical lymphadenopathy        Lab Results:   No visits with results within 1 Day(s) from this visit  Latest known visit with results is:   Admission on 09/11/2017, Discharged on 09/11/2017   Component Date Value    Case Report 09/11/2017                      Value:Surgical Pathology Report                         Case: [id number]                                   Authorizing Provider:  Markie Bridges MD           Collected:           09/11/2017 1346              Ordering Location:     [address] [place] [address]      Received:            09/11/2017 [facility] End[facility]                                                           Pathologist:           Kalyn Pike DO                                                            Specimens:   A) - Duodenum, R/O celiac                                                                           B) - Stomach, r/o H pylori                                                                          C) - Intestine, Random terminal ileum  evaluation of UC                                             D) - Large Intestine, Right/Ascending Colon, Evaluation of UC  Normal appearance                    E) - Rectum, Ulcerative colitis                                                            Final Diagnosis 09/11/2017                      Value: This result contains rich text formatting which cannot be displayed here   Additional Information 09/11/2017                      Value: This result contains rich text formatting which cannot be displayed here     [place] Headings Description 09/11/2017                      Value: This result contains rich text formatting which cannot be displayed here

## 2018-01-31 NOTE — LETTER
January 31, 2018     Bebeto Becker MD  [address] [facility] [address] [address]    Patient: Irene Goodwin   YOB: 1970   Date of Visit: 1/31/2018       Dear Dr Montserrat Hutchinson: Thank you for referring Irene Goodwin to me for evaluation  Below are my notes for this consultation  If you have questions, please do not hesitate to call me  I look forward to following your patient along with you  Sincerely,        Aurora Man MD        CC: No Recipients  Aurora Man MD  1/31/2018 12:57 PM  Sign at close encounter  [facility] - Outpatient Consultation  Irene Goodwin 56 y o  male MRN: [id number]  Encounter: [id number]          ASSESSMENT AND PLAN:      1  Ulcerative rectosigmoiditis with rectal bleeding (HCC)  I will change his Entyvio infusions to once every six weeks  I have asked him to continue using the Cortifoam twice a day and we will refill his prescription  I will have him follow up in the office in two months to reassess his symptoms and I will check his labs today including a CBC, comprehensive metabolic panel, and C-reactive protein  2  Chronic diarrhea  His diarrhea is likely due to his ulcerative proctosigmoiditis  I will see if his symptoms improve on the shorter interval of an tibial effusions and the Cortifoam twice a day  He can also take Imodium as needed for possibly overlying diarrhea predominant irritable bowel syndrome  ______________________________________________________________________    HPI:  He presents for follow-up of his ulcerative proctosigmoiditis  He has been having diarrhea symptoms and occasional incontinence despite taking into view every eight weeks  He he does not feel he can tolerate the enemas or the foam very well  He denies any abdominal pain, nausea, vomiting, bleeding, or weight loss        REVIEW OF SYSTEMS:    CONSTITUTIONAL: Denies any fever, chills, rigors, and weight loss, but complains of fatigue  HEENT: No earache or tinnitus  Denies hearing loss or visual disturbances  CARDIOVASCULAR: No chest pain or palpitations  RESPIRATORY: Denies any cough, hemoptysis, shortness of breath or dyspnea on exertion  GASTROINTESTINAL: As noted in the History of Present Illness  GENITOURINARY: No problems with urination  Denies any hematuria or dysuria  NEUROLOGIC: No dizziness or vertigo, denies headaches  MUSCULOSKELETAL: Denies any muscle or joint pain  SKIN: Denies skin rashes or itching  ENDOCRINE: Denies excessive thirst  Denies intolerance to heat or cold  PSYCHOSOCIAL: Denies depression or anxiety  Denies any recent memory loss  Historical Information   Past Medical History:   Diagnosis Date    Anxiety     Ulcerative colitis [facility])      Past Surgical History:   Procedure Laterality Date    COLONOSCOPY N/A 4/4/2016    Procedure: COLONOSCOPY;  Surgeon: Aurora Man MD;  Location: [facility]; Service:     EGD AND COLONOSCOPY N/A 9/11/2017    Procedure: EGD AND COLONOSCOPY;  Surgeon: Aurora Man MD;  Location:  GI LAB; Service: Gastroenterology    HERNIA REPAIR      as infant     Social History   History   Alcohol Use    Yes     Comment: occationally     History   Drug Use No     History   Smoking Status    Former Smoker   Smokeless Tobacco    Never Used     No family history on file  Meds/Allergies       Current Outpatient Prescriptions:     ENTYVIO 300 MG SOLR    Mesalamine (LIALDA PO)    sertraline (ZOLOFT) 50 mg tablet    Vedolizumab (ENTYVIO IV)  No current facility-administered medications for this visit       Facility-Administered Medications Ordered in Other Visits:     acetaminophen (TYLENOL) tablet 975 mg, 975 mg, Oral, Once    diphenhydrAMINE (BENADRYL) tablet 25 mg, 25 mg, Oral, Once    methylPREDNISolone sodium succinate (Solu-MEDROL) injection 40 mg, 40 mg, Intravenous, Once    sodium chloride 0 9 % infusion, 20 mL/hr, Intravenous, Continuous   vedolizumab (ENTYVIO) 300 mg in sodium chloride 0 9 % 250 mL IVPB, 300 mg, Intravenous, Once    No Known Allergies        Objective     Blood pressure 122/84, pulse 83, temperature (!) 96 °F (35 6 °C), temperature source Tympanic, height 5' 10" (1 778 m), weight 125 kg (275 lb)  PHYSICAL EXAM:      General Appearance:   Alert, cooperative, no distress   HEENT:   Normocephalic, atraumatic, anicteric      Neck:  Supple, symmetrical, trachea midline   Lungs:   Clear to auscultation bilaterally; no rales, rhonchi or wheezing; respirations unlabored    Heart[de-identified]   Regular rate and rhythm; no murmur, rub, or gallop  Abdomen:   Soft, obese, non-tender, non-distended; normal bowel sounds; no masses, no organomegaly    Genitalia:   Deferred    Rectal:   Deferred    Extremities:  No cyanosis, clubbing or edema    Pulses:  2+ and symmetric all extremities    Skin:  No jaundice, rashes, or lesions    Lymph nodes:  No palpable cervical lymphadenopathy        Lab Results:   No visits with results within 1 Day(s) from this visit     Latest known visit with results is:   Admission on 09/11/2017, Discharged on 09/11/2017   Component Date Value    Case Report 09/11/2017                      Value:Surgical Pathology Report                         Case: [id number]                                   Authorizing Provider:  Hema Galarza MD           Collected:           09/11/2017 1346              Ordering Location:     [address] [place] [address]      Received:            09/11/2017 [facility]                                                           Pathologist:           Humera Saucedo DO                                                            Specimens:   A) - Duodenum, R/O celiac                                                                           B) - Stomach, r/o H pylori                                                                          C) - Intestine, Random terminal ileum  evaluation of UC                                             D) - Large Intestine, Right/Ascending Colon, Evaluation of UC  Normal appearance                    E) - Rectum, Ulcerative colitis                                                            Final Diagnosis 09/11/2017                      Value: This result contains rich text formatting which cannot be displayed here   Additional Information 09/11/2017                      Value: This result contains rich text formatting which cannot be displayed here  Andrea Ellis Gross Description 09/11/2017                      Value: This result contains rich text formatting which cannot be displayed here

## 2018-02-01 ENCOUNTER — TELEPHONE (OUTPATIENT)
Dept: GASTROENTEROLOGY | Facility: CLINIC | Age: 48
End: 2018-02-01

## 2018-02-02 DIAGNOSIS — K51.30 ULCERATIVE RECTOSIGMOIDITIS WITHOUT COMPLICATION (HCC): Primary | ICD-10-CM

## 2018-02-27 ENCOUNTER — TELEPHONE (OUTPATIENT)
Dept: GASTROENTEROLOGY | Facility: MEDICAL CENTER | Age: 48
End: 2018-02-27

## 2018-02-27 ENCOUNTER — TELEPHONE (OUTPATIENT)
Dept: GASTROENTEROLOGY | Facility: AMBULARY SURGERY CENTER | Age: 48
End: 2018-02-27

## 2018-02-27 NOTE — TELEPHONE ENCOUNTER
PT called tommy due to receiving call from pharm about receiving Entyvio in Feb and not March  - Told Pt I would look into it  1  PT had received call from 2017 7 Nelda Mcfadden ([phone] ([facility] spec )- Spoke to [place] [employer] - updated their info that [facility] is now taking care of RX  2  Called [facility]  Spoke to Martin Long and Pharm  - They received new Rx from Dr Eric Moreau on 2/2/18 but RX came in incorrect (every 6 weeks with refill every 8 weeks)  Provided Verbal that RX is 300 mg every 6 weeks, with refills every 6 weeks with 4 refills  Once correction was made, Martin stated Prior Thyra Lo is required  3  Prior auth  Needs to be called into [phone]      Assigned to [place] for PA ASAP

## 2018-02-27 NOTE — TELEPHONE ENCOUNTER
Dr Patti Carias pt called stating he would like to speak to Dr Aidee Delgado or someone about going on Prednisone as discussed previously   Pt can be reached at [phone]

## 2018-02-27 NOTE — TELEPHONE ENCOUNTER
Hi Dr Aminta Tejada,     I spoke to this pt's wife, Aryan Horne, who called for Agnieszka Bocanegra  She reports he is having worsening abdominal pain, occasional bloody/mucusy stools, and fecal urgency with some accidents  She denies worsening diarrhea, fevers, or chills  She is requesting that he start on 60mg prednisone as she feels 40mg has not helped in the past - would you be ok with this? He is on Entyvio and the interval was just shortened to 6 weeks  He is also on Cortifoam enemas  Thanks for your help!     Coreen Vargas

## 2018-02-27 NOTE — TELEPHONE ENCOUNTER
Spoke to Pt  Pt stated he is in a flare up and Dr Honey Mosquera informed PT to take Predisone 40 mg  PT wants to know how much predisone to take and if he takes a higher dose, will he be able to shorten the flare up? Please advise   Please call PT at [phone]

## 2018-02-28 NOTE — TELEPHONE ENCOUNTER
Pt wife called back in - she would like a call back once an answer is available for her @ [phone]

## 2018-03-05 ENCOUNTER — TELEPHONE (OUTPATIENT)
Dept: GASTROENTEROLOGY | Facility: CLINIC | Age: 48
End: 2018-03-05

## 2018-03-05 DIAGNOSIS — K51.311 ULCERATIVE RECTOSIGMOIDITIS WITH RECTAL BLEEDING (HCC): ICD-10-CM

## 2018-03-05 NOTE — TELEPHONE ENCOUNTER
Patient called to follow up on entyvio  He states that [employer] needs additional information in order to process the order   They can be reached at [phone] and fax is [phone]

## 2018-03-05 NOTE — TELEPHONE ENCOUNTER
Dr Frankie Méndez pt    Wife called in stating that Hari Berkowitz needs refill for his steroid medication and Cortifoam sent to the pharmacy   Also, she's stating that she spoke to Dr Frankie Méndez a couple of days ago and he stated that Hair Berkowitz should be having a US of Gallbladder, please advise when the order has been put in so she could call and schedule

## 2018-03-05 NOTE — TELEPHONE ENCOUNTER
Hi Dr Neil Moore,    Did you want this pt started on a steroid taper and to have an ultrasound? If so, I am happy to put those orders in  I recall his wife requesting a higher dose of steroids (60mg) but I did let her know we usually start at 40mg  Let me know which you prefer for [address]  Thanks!     Speedy Taylor

## 2018-03-05 NOTE — TELEPHONE ENCOUNTER
Called [facility] and spoke with Wai Cheema said pt  Needs a loading dose because this is the first time the patient is taking entyvio  Wai Cheema is faxing over the proper paperwork to get the loading dose

## 2018-03-06 DIAGNOSIS — R10.11 RIGHT UPPER QUADRANT PAIN: Primary | ICD-10-CM

## 2018-03-12 ENCOUNTER — TELEPHONE (OUTPATIENT)
Dept: GASTROENTEROLOGY | Facility: CLINIC | Age: 48
End: 2018-03-12

## 2018-03-12 NOTE — TELEPHONE ENCOUNTER
DR Madi Waters PT    Pt spouse called requesting pt rx for cortifoam and steroids as requested by DR Keyonna Albrecht  Caller also requesting a the order for ultrasound to be mail    [facility] [address]  [address]  [facility] [place] [address]

## 2018-03-14 ENCOUNTER — TELEPHONE (OUTPATIENT)
Dept: GASTROENTEROLOGY | Facility: CLINIC | Age: 48
End: 2018-03-14

## 2018-03-16 ENCOUNTER — TELEPHONE (OUTPATIENT)
Dept: GASTROENTEROLOGY | Facility: AMBULARY SURGERY CENTER | Age: 48
End: 2018-03-16

## 2018-03-16 ENCOUNTER — HOSPITAL ENCOUNTER (OUTPATIENT)
Dept: INFUSION CENTER | Facility: CLINIC | Age: 48
Discharge: HOME | End: 2018-03-16

## 2018-03-16 NOTE — TELEPHONE ENCOUNTER
Patient's wife Alvarado Like call and was upset that the entyvio was not authorized for her   He went to the infusion center to be told that it was not authorized  The maintence dose changed to 6 weeks instead of 8  I called the insurance company at [phone] and spoke to [place] and gave all the information  A fax is being sent to our [place] [address] office and once received we will to send notes over  She has a note to expedite the order  Called Alvarado Like back and left a message  Meilda Mcginnis is also aware and will call the patient to follow up

## 2018-03-16 NOTE — TELEPHONE ENCOUNTER
Do we know if these scripts have been sent to the patient? The patient came in the office today because he has not received these scripts yet

## 2018-03-16 NOTE — TELEPHONE ENCOUNTER
I do not know if these were ever sent  Orders were entered into epic  Please print orders for lialda, cortifoam and u/s and mail to patient  Dr Ammy Christine originally requested this on 3/6   Thank you

## 2018-03-19 NOTE — TELEPHONE ENCOUNTER
Appeals Process has been initiated  Copy of letter is in pt's chart  Denial letter, Appeals letter, and supporting documentation has been submitted  Faxed to [address] Dept @ [phone]  It was marked as an URGENT request  Turn around time is up to 72hrs for decision  Peer to Peer contact info was also included in the letter just incase it was needed

## 2018-03-19 NOTE — TELEPHONE ENCOUNTER
Spoke with Lilia Correa at [address] [place] [address] who states the patient's entyvio was authorized for every 8 weeks and was denied for every 6 weeks  Can we please do an appeal for this patient? Patient came into the office on Friday very upset because his medication was never delivered due to this confusion  Thank you

## 2018-03-20 ENCOUNTER — TELEPHONE (OUTPATIENT)
Dept: GASTROENTEROLOGY | Facility: MEDICAL CENTER | Age: 48
End: 2018-03-20

## 2018-03-20 NOTE — TELEPHONE ENCOUNTER
Called [facility] again regarding the delivery of patient's medication  [facility]  I spoke with Sanjay Nicholson who assured me the medication will be delivered to [address] [place] [address] [address] [place] [address] by Thursday, March 22  Patient has his appointment scheduled for Friday, March 23

## 2018-03-20 NOTE — TELEPHONE ENCOUNTER
Appeals has been approved  Auth# [id number]  Effective 1/23/2018 - 1/23/2019  Spoke with Aguila Abdi  @ [facility] Dept

## 2018-03-20 NOTE — TELEPHONE ENCOUNTER
Received approval notice for entyvio treatment every 6 weeks, called [facility] to verify  Spoke with Roger Lowry @ [facility] who verified for every 6 weeks  Was transferred to [facility] who verified that he will call the patient to obtain his permission to send the entyvio

## 2018-03-22 ENCOUNTER — TELEPHONE (OUTPATIENT)
Dept: GASTROENTEROLOGY | Facility: CLINIC | Age: 48
End: 2018-03-22

## 2018-03-22 NOTE — TELEPHONE ENCOUNTER
Dr Akshat De La Vega pt    Froilan Arredondo from oncology called to advise that this pt was approved for 5$ copay card for entyvio so this pt can be scheduled for infusion   If anything, call her back @ [phone] on tues, thurs, or fri - mon and Wednesdays she is available @ [phone]

## 2018-03-23 ENCOUNTER — HOSPITAL ENCOUNTER (OUTPATIENT)
Dept: INFUSION CENTER | Facility: CLINIC | Age: 48
Discharge: HOME | End: 2018-03-23
Payer: COMMERCIAL

## 2018-03-23 VITALS
RESPIRATION RATE: 16 BRPM | TEMPERATURE: 96.1 F | SYSTOLIC BLOOD PRESSURE: 161 MMHG | DIASTOLIC BLOOD PRESSURE: 92 MMHG | HEART RATE: 80 BPM

## 2018-03-23 PROCEDURE — 96375 TX/PRO/DX INJ NEW DRUG ADDON: CPT

## 2018-03-23 PROCEDURE — 96413 CHEMO IV INFUSION 1 HR: CPT

## 2018-03-23 RX ORDER — SODIUM CHLORIDE 9 MG/ML
20 INJECTION, SOLUTION INTRAVENOUS CONTINUOUS
Status: DISCONTINUED | OUTPATIENT
Start: 2018-03-23 | End: 2018-03-26 | Stop reason: HOSPADM

## 2018-03-23 RX ORDER — DIPHENHYDRAMINE HCL 25 MG
25 TABLET ORAL ONCE
Status: DISCONTINUED | OUTPATIENT
Start: 2018-03-23 | End: 2018-03-26 | Stop reason: HOSPADM

## 2018-03-23 RX ORDER — METHYLPREDNISOLONE SODIUM SUCCINATE 40 MG/ML
40 INJECTION, POWDER, LYOPHILIZED, FOR SOLUTION INTRAMUSCULAR; INTRAVENOUS ONCE
Status: COMPLETED | OUTPATIENT
Start: 2018-03-23 | End: 2018-03-23

## 2018-03-23 RX ORDER — ACETAMINOPHEN 325 MG/1
975 TABLET ORAL ONCE
Status: DISCONTINUED | OUTPATIENT
Start: 2018-03-23 | End: 2018-03-26 | Stop reason: HOSPADM

## 2018-03-23 RX ADMIN — METHYLPREDNISOLONE SODIUM SUCCINATE 40 MG: 40 INJECTION, POWDER, FOR SOLUTION INTRAMUSCULAR; INTRAVENOUS at 14:27

## 2018-03-23 RX ADMIN — SODIUM CHLORIDE 20 ML/HR: 9 INJECTION, SOLUTION INTRAVENOUS at 14:25

## 2018-03-23 NOTE — PLAN OF CARE
Problem: Potential for Falls  Goal: Patient will remain free of falls  INTERVENTIONS:  - Assess patient frequently for physical needs  -  Identify cognitive and physical deficits and behaviors that affect risk of falls    -  [place] fall precautions as indicated by assessment   - Educate patient/family on patient safety including physical limitations  - Instruct patient to call for assistance with activity based on assessment  - Modify environment to reduce risk of injury  - Consider OT/PT consult to assist with strengthening/mobility   Outcome: Progressing

## 2018-03-23 NOTE — PROGRESS NOTES
Patient arrived to unit without any complaints  Patient tolerated infusion well without complications  Next appt made and AVS given

## 2018-04-02 DIAGNOSIS — K51.30 ULCERATIVE RECTOSIGMOIDITIS WITHOUT COMPLICATION (HCC): ICD-10-CM

## 2018-04-02 DIAGNOSIS — K51.311 ULCERATIVE RECTOSIGMOIDITIS WITH RECTAL BLEEDING (HCC): ICD-10-CM

## 2018-04-02 RX ORDER — MESALAMINE 1.2 G/1
TABLET, DELAYED RELEASE ORAL
Qty: 120 TABLET | Refills: 3 | Status: SHIPPED | OUTPATIENT
Start: 2018-04-02 | End: 2018-05-03 | Stop reason: SDUPTHER

## 2018-04-02 NOTE — TELEPHONE ENCOUNTER
Patients wife picked up Rx and U/S order from [address] [facility] office

## 2018-04-02 NOTE — TELEPHONE ENCOUNTER
Pt spouse called requesting pt rx for lialda and cortifoam  I spoke to Sreekanth snow who informed me that the PA have to print the prescriptions  [facility]   Pt spouse will be coming by the [place] office in one hour to , then [place] for approval

## 2018-04-02 NOTE — TELEPHONE ENCOUNTER
Okay, I ordered those and they shouldve printed to [place] office, if not please print for patient's wife when she arrives   Thank you

## 2018-04-06 ENCOUNTER — TELEPHONE (OUTPATIENT)
Dept: GASTROENTEROLOGY | Facility: MEDICAL CENTER | Age: 48
End: 2018-04-06

## 2018-04-06 NOTE — TELEPHONE ENCOUNTER
Called pt; bing to call office  Patient has an upcoming appt with us 4/10; however did not complete his labs and US Abdomen  Please offer to reschedule his appt until this testing is complete  Thank you

## 2018-05-03 ENCOUNTER — TELEPHONE (OUTPATIENT)
Dept: GASTROENTEROLOGY | Facility: MEDICAL CENTER | Age: 48
End: 2018-05-03

## 2018-05-03 DIAGNOSIS — K51.311 ULCERATIVE RECTOSIGMOIDITIS WITH RECTAL BLEEDING (HCC): ICD-10-CM

## 2018-05-03 DIAGNOSIS — K51.30 ULCERATIVE RECTOSIGMOIDITIS WITHOUT COMPLICATION (HCC): ICD-10-CM

## 2018-05-03 RX ORDER — MESALAMINE 1.2 G/1
TABLET, DELAYED RELEASE ORAL
Qty: 120 TABLET | Refills: 0 | Status: SHIPPED | OUTPATIENT
Start: 2018-05-03 | End: 2020-09-22 | Stop reason: ALTCHOICE

## 2018-05-03 RX ORDER — ACETAMINOPHEN 325 MG/1
975 TABLET ORAL ONCE
Status: DISCONTINUED | OUTPATIENT
Start: 2018-05-04 | End: 2018-05-07 | Stop reason: HOSPADM

## 2018-05-03 RX ORDER — DIPHENHYDRAMINE HCL 25 MG
25 TABLET ORAL ONCE
Status: DISCONTINUED | OUTPATIENT
Start: 2018-05-04 | End: 2018-05-07 | Stop reason: HOSPADM

## 2018-05-03 RX ORDER — SODIUM CHLORIDE 9 MG/ML
20 INJECTION, SOLUTION INTRAVENOUS CONTINUOUS
Status: DISCONTINUED | OUTPATIENT
Start: 2018-05-04 | End: 2018-05-07 | Stop reason: HOSPADM

## 2018-05-03 RX ORDER — METHYLPREDNISOLONE SODIUM SUCCINATE 40 MG/ML
40 INJECTION, POWDER, LYOPHILIZED, FOR SOLUTION INTRAMUSCULAR; INTRAVENOUS ONCE
Status: COMPLETED | OUTPATIENT
Start: 2018-05-04 | End: 2018-05-04

## 2018-05-03 NOTE — TELEPHONE ENCOUNTER
Dr Doug Simms pt    [facility] called stating pt sent them scripts for Cortifoam and Mesalamine  The scripts that was sent to them are not in regular form  Need conformation of scripts  Please call [phone] Ref# [id number]   Fax #[phone]

## 2018-05-04 ENCOUNTER — HOSPITAL ENCOUNTER (OUTPATIENT)
Dept: INFUSION CENTER | Facility: CLINIC | Age: 48
Discharge: HOME | End: 2018-05-04
Payer: COMMERCIAL

## 2018-05-04 ENCOUNTER — TELEPHONE (OUTPATIENT)
Dept: GASTROENTEROLOGY | Facility: CLINIC | Age: 48
End: 2018-05-04

## 2018-05-04 VITALS
DIASTOLIC BLOOD PRESSURE: 86 MMHG | SYSTOLIC BLOOD PRESSURE: 131 MMHG | TEMPERATURE: 96.8 F | HEART RATE: 84 BPM | RESPIRATION RATE: 20 BRPM

## 2018-05-04 PROCEDURE — 96375 TX/PRO/DX INJ NEW DRUG ADDON: CPT

## 2018-05-04 PROCEDURE — 96413 CHEMO IV INFUSION 1 HR: CPT

## 2018-05-04 RX ADMIN — SODIUM CHLORIDE 20 ML/HR: 0.9 INJECTION, SOLUTION INTRAVENOUS at 14:26

## 2018-05-04 RX ADMIN — METHYLPREDNISOLONE SODIUM SUCCINATE 40 MG: 40 INJECTION, POWDER, FOR SOLUTION INTRAMUSCULAR; INTRAVENOUS at 14:22

## 2018-05-04 NOTE — TELEPHONE ENCOUNTER
Dr Maribel Suazo pt    Honey Jacobson from [address] [place] [address] called to clarify scripts for Lialda and Cortifoam, please return the call @ [phone] ref# is [id number]

## 2018-05-04 NOTE — TELEPHONE ENCOUNTER
Pharmacy called in to advise Cortifoam is on backorder please advise if you would like to change the medication [phone] ref# is [id number]

## 2018-05-08 ENCOUNTER — TELEPHONE (OUTPATIENT)
Dept: GASTROENTEROLOGY | Facility: CLINIC | Age: 48
End: 2018-05-08

## 2018-05-08 NOTE — TELEPHONE ENCOUNTER
Hi Dr Marcin Post,    I do not see another medication ordered besides the cortifoam and Lialda  I am happy to call him or to order a different medication; I am not sure what the alternative medication ordered was that Sebastian Felix is referring to  Let me know if you would like to order another medication for him, or give him a call to discuss  Thanks!     Liz Echeverria

## 2018-05-08 NOTE — TELEPHONE ENCOUNTER
[facility] called to notify you that the cortifoam is on back order  They want to know if you would like to submit an alternate   Call back # [phone], ref # [id number]

## 2018-05-08 NOTE — TELEPHONE ENCOUNTER
[facility] pharmacy called to advise the alternative medication is also on backorder along with cortifoam  Please advise

## 2018-05-10 DIAGNOSIS — K51.311 ULCERATIVE RECTOSIGMOIDITIS WITH RECTAL BLEEDING (HCC): ICD-10-CM

## 2018-05-10 NOTE — TELEPHONE ENCOUNTER
Called patient's home phone number  Patient's wife states Isreal Dear is out of town and that is why he can not be reached   She said we can send the cortifoam to [address] [place] [address] in [place] on [address], their phone # is [phone]

## 2018-05-11 ENCOUNTER — TELEPHONE (OUTPATIENT)
Dept: GASTROENTEROLOGY | Facility: CLINIC | Age: 48
End: 2018-05-11

## 2018-05-11 DIAGNOSIS — K51.919 ULCERATIVE COLITIS WITH COMPLICATION, UNSPECIFIED LOCATION (HCC): Primary | ICD-10-CM

## 2018-05-11 RX ORDER — MESALAMINE 1000 MG/1
1000 SUPPOSITORY RECTAL
Qty: 30 SUPPOSITORY | Refills: 5 | Status: SHIPPED | OUTPATIENT
Start: 2018-05-11 | End: 2020-09-22 | Stop reason: ALTCHOICE

## 2018-05-11 NOTE — TELEPHONE ENCOUNTER
Cortifoam was sent to [facility] due to it being on back order at [facility] pharmacy  Patient called stating that [facility] pharmacy informed him they also have cortifoam on back order  Patient is requesting a possible alternate medication in the mean time? Please advise, thank you!

## 2018-05-29 ENCOUNTER — TELEPHONE (OUTPATIENT)
Dept: GASTROENTEROLOGY | Facility: AMBULARY SURGERY CENTER | Age: 48
End: 2018-05-29

## 2018-05-29 NOTE — TELEPHONE ENCOUNTER
[facility]   [phone]  [facility]#[id number]    DR CERVANTES'SPT    [facility] care carmen called requesting verification of pt dublicate prescriptions ordered by Maggi Mcgrath

## 2018-05-30 NOTE — TELEPHONE ENCOUNTER
I called and clarified that Cosimo Abel is an oral medication and Canasa is rectal, and both medications were prescribed

## 2018-06-14 RX ORDER — SODIUM CHLORIDE 9 MG/ML
20 INJECTION, SOLUTION INTRAVENOUS CONTINUOUS
Status: DISCONTINUED | OUTPATIENT
Start: 2018-06-15 | End: 2018-06-18 | Stop reason: HOSPADM

## 2018-06-14 RX ORDER — METHYLPREDNISOLONE SODIUM SUCCINATE 40 MG/ML
40 INJECTION, POWDER, LYOPHILIZED, FOR SOLUTION INTRAMUSCULAR; INTRAVENOUS ONCE
Status: COMPLETED | OUTPATIENT
Start: 2018-06-15 | End: 2018-06-15

## 2018-06-14 RX ORDER — ACETAMINOPHEN 325 MG/1
975 TABLET ORAL ONCE
Status: DISCONTINUED | OUTPATIENT
Start: 2018-06-15 | End: 2018-06-18 | Stop reason: HOSPADM

## 2018-06-15 ENCOUNTER — HOSPITAL ENCOUNTER (OUTPATIENT)
Dept: INFUSION CENTER | Facility: CLINIC | Age: 48
Discharge: HOME | End: 2018-06-15
Payer: COMMERCIAL

## 2018-06-15 VITALS
RESPIRATION RATE: 18 BRPM | HEART RATE: 90 BPM | DIASTOLIC BLOOD PRESSURE: 72 MMHG | TEMPERATURE: 98.1 F | SYSTOLIC BLOOD PRESSURE: 125 MMHG

## 2018-06-15 PROCEDURE — 96375 TX/PRO/DX INJ NEW DRUG ADDON: CPT

## 2018-06-15 PROCEDURE — 96413 CHEMO IV INFUSION 1 HR: CPT

## 2018-06-15 RX ADMIN — SODIUM CHLORIDE 20 ML/HR: 0.9 INJECTION, SOLUTION INTRAVENOUS at 14:15

## 2018-06-15 RX ADMIN — METHYLPREDNISOLONE SODIUM SUCCINATE 40 MG: 40 INJECTION, POWDER, FOR SOLUTION INTRAMUSCULAR; INTRAVENOUS at 14:26

## 2018-06-15 NOTE — PLAN OF CARE
Problem: PAIN - ADULT  Goal: Verbalizes/displays adequate comfort level or baseline comfort level  Interventions:  - Encourage patient to monitor pain and request assistance  - Assess pain using appropriate pain scale  - Administer analgesics based on type and severity of pain and evaluate response  - Implement non-pharmacological measures as appropriate and evaluate response  - Consider cultural and social influences on pain and pain management  - Notify physician/advanced practitioner if interventions unsuccessful or patient reports new pain  Outcome: Progressing      Problem: INFECTION - ADULT  Goal: Absence or prevention of progression during hospitalization  INTERVENTIONS:  - Assess and monitor for signs and symptoms of infection  - Monitor lab/diagnostic results  - Monitor all insertion sites, i e  indwelling lines, tubes, and drains  - Monitor endotracheal (as able) and nasal secretions for changes in amount and color  - [place] appropriate cooling/warming therapies per order  - Administer medications as ordered  - Instruct and encourage patient and family to use good hand hygiene technique  - Identify and instruct in appropriate isolation precautions for identified infection/condition  Outcome: Progressing    Goal: Absence of fever/infection during neutropenic period  INTERVENTIONS:  - Monitor WBC  - Implement neutropenic guidelines  Outcome: Progressing      Problem: Knowledge Deficit  Goal: Patient/family/caregiver demonstrates understanding of disease process, treatment plan, medications, and discharge instructions  Complete learning assessment and assess knowledge base    Interventions:  - Provide teaching at level of understanding  - Provide teaching via preferred learning methods  Outcome: Progressing

## 2018-06-15 NOTE — PROGRESS NOTES
Pt  Denies new symptoms or concerns at this time  Pt denies recent infection or use of antibiotics  Pt  States he took Tylenol and Benadryl at home prior to coming to infusion  Solumedrol 40mg given IV as ordered

## 2018-07-26 RX ORDER — METHYLPREDNISOLONE SODIUM SUCCINATE 40 MG/ML
40 INJECTION, POWDER, LYOPHILIZED, FOR SOLUTION INTRAMUSCULAR; INTRAVENOUS ONCE
Status: COMPLETED | OUTPATIENT
Start: 2018-07-27 | End: 2018-07-27

## 2018-07-26 RX ORDER — SODIUM CHLORIDE 9 MG/ML
20 INJECTION, SOLUTION INTRAVENOUS CONTINUOUS
Status: DISCONTINUED | OUTPATIENT
Start: 2018-07-27 | End: 2018-07-30 | Stop reason: HOSPADM

## 2018-07-26 RX ORDER — DIPHENHYDRAMINE HCL 25 MG
25 TABLET ORAL ONCE
Status: DISCONTINUED | OUTPATIENT
Start: 2018-07-27 | End: 2018-07-30 | Stop reason: HOSPADM

## 2018-07-26 RX ORDER — ACETAMINOPHEN 325 MG/1
975 TABLET ORAL ONCE
Status: DISCONTINUED | OUTPATIENT
Start: 2018-07-27 | End: 2018-07-30 | Stop reason: HOSPADM

## 2018-07-27 ENCOUNTER — HOSPITAL ENCOUNTER (OUTPATIENT)
Dept: INFUSION CENTER | Facility: CLINIC | Age: 48
Discharge: HOME | End: 2018-07-27
Payer: COMMERCIAL

## 2018-07-27 VITALS
TEMPERATURE: 98.2 F | HEART RATE: 96 BPM | SYSTOLIC BLOOD PRESSURE: 137 MMHG | DIASTOLIC BLOOD PRESSURE: 71 MMHG | RESPIRATION RATE: 20 BRPM

## 2018-07-27 PROCEDURE — 96375 TX/PRO/DX INJ NEW DRUG ADDON: CPT

## 2018-07-27 PROCEDURE — 96413 CHEMO IV INFUSION 1 HR: CPT

## 2018-07-27 RX ADMIN — METHYLPREDNISOLONE SODIUM SUCCINATE 40 MG: 40 INJECTION, POWDER, FOR SOLUTION INTRAMUSCULAR; INTRAVENOUS at 14:22

## 2018-07-27 RX ADMIN — SODIUM CHLORIDE 20 ML/HR: 9 INJECTION, SOLUTION INTRAVENOUS at 14:20

## 2018-07-27 NOTE — PROGRESS NOTES
Patient arrived on unit for Entyvio  Denies any discomforts/infections  Patient premedicated with Tylenol and Benadryl prior to coming for infusion

## 2018-08-10 ENCOUNTER — TELEPHONE (OUTPATIENT)
Dept: GASTROENTEROLOGY | Facility: CLINIC | Age: 48
End: 2018-08-10

## 2018-08-10 DIAGNOSIS — K51.30 ULCERATIVE RECTOSIGMOIDITIS WITHOUT COMPLICATION (HCC): ICD-10-CM

## 2018-08-10 NOTE — TELEPHONE ENCOUNTER
Patient's wife called on behalf of [place]ia So  States [facility] called her to inform her there are no more refills left on his Entyvio and he is due for his next injection  Patient's wife was very upset because there were not enough refills  [facility] requested that we print the script and fax it to [phone]    Please print the script to the [place] office so I can fax it to the pharmacy

## 2018-08-13 DIAGNOSIS — K51.30 ULCERATIVE RECTOSIGMOIDITIS WITHOUT COMPLICATION (HCC): ICD-10-CM

## 2018-09-04 DIAGNOSIS — K51.30 ULCERATIVE RECTOSIGMOIDITIS WITHOUT COMPLICATION (HCC): ICD-10-CM

## 2018-09-04 RX ORDER — VEDOLIZUMAB 300 MG/5ML
INJECTION, POWDER, LYOPHILIZED, FOR SOLUTION INTRAVENOUS
Qty: 1 EACH | Refills: 3 | Status: SHIPPED | OUTPATIENT
Start: 2018-09-04 | End: 2018-12-24 | Stop reason: SDUPTHER

## 2018-09-05 ENCOUNTER — TELEPHONE (OUTPATIENT)
Dept: GASTROENTEROLOGY | Facility: CLINIC | Age: 48
End: 2018-09-05

## 2018-09-05 NOTE — TELEPHONE ENCOUNTER
Dr Rajni Jack pt    Per the infusion center, the pt is going in on Friday and will need new orders for entyvio   Please fax to [phone]

## 2018-09-06 RX ORDER — SODIUM CHLORIDE 9 MG/ML
20 INJECTION, SOLUTION INTRAVENOUS CONTINUOUS
Status: DISCONTINUED | OUTPATIENT
Start: 2018-09-07 | End: 2018-09-10 | Stop reason: HOSPADM

## 2018-09-06 RX ORDER — ACETAMINOPHEN 325 MG/1
975 TABLET ORAL ONCE
Status: DISCONTINUED | OUTPATIENT
Start: 2018-09-07 | End: 2018-09-10 | Stop reason: HOSPADM

## 2018-09-06 RX ORDER — DIPHENHYDRAMINE HCL 25 MG
25 TABLET ORAL ONCE
Status: DISCONTINUED | OUTPATIENT
Start: 2018-09-06 | End: 2018-09-10 | Stop reason: HOSPADM

## 2018-09-06 RX ORDER — METHYLPREDNISOLONE SODIUM SUCCINATE 40 MG/ML
40 INJECTION, POWDER, LYOPHILIZED, FOR SOLUTION INTRAMUSCULAR; INTRAVENOUS ONCE
Status: COMPLETED | OUTPATIENT
Start: 2018-09-07 | End: 2018-09-07

## 2018-09-07 ENCOUNTER — HOSPITAL ENCOUNTER (OUTPATIENT)
Dept: INFUSION CENTER | Facility: CLINIC | Age: 48
Discharge: HOME | End: 2018-09-07
Payer: COMMERCIAL

## 2018-09-07 VITALS
TEMPERATURE: 96.7 F | HEART RATE: 85 BPM | SYSTOLIC BLOOD PRESSURE: 121 MMHG | RESPIRATION RATE: 16 BRPM | DIASTOLIC BLOOD PRESSURE: 78 MMHG

## 2018-09-07 PROCEDURE — 96375 TX/PRO/DX INJ NEW DRUG ADDON: CPT

## 2018-09-07 PROCEDURE — 96413 CHEMO IV INFUSION 1 HR: CPT

## 2018-09-07 RX ADMIN — METHYLPREDNISOLONE SODIUM SUCCINATE 40 MG: 40 INJECTION, POWDER, FOR SOLUTION INTRAMUSCULAR; INTRAVENOUS at 14:35

## 2018-09-07 RX ADMIN — SODIUM CHLORIDE 20 ML/HR: 0.9 INJECTION, SOLUTION INTRAVENOUS at 14:25

## 2018-09-07 NOTE — PROGRESS NOTES
Pt  Tolerated Entyvio without adverse event  Future appointment scheduled for 6 weeks  AVS provided

## 2018-09-07 NOTE — PROGRESS NOTES
Pt  Denies new symptoms or concerns at this time  Pt  Denies recent infection  Pt  Verbalizes taking Tylenol and Benadryl prior to coming to infusion  Entyvio ordered for infusion today

## 2018-10-18 RX ORDER — DIPHENHYDRAMINE HCL 25 MG
25 TABLET ORAL ONCE
Status: DISCONTINUED | OUTPATIENT
Start: 2018-10-19 | End: 2018-10-22 | Stop reason: HOSPADM

## 2018-10-18 RX ORDER — SODIUM CHLORIDE 9 MG/ML
20 INJECTION, SOLUTION INTRAVENOUS CONTINUOUS
Status: DISCONTINUED | OUTPATIENT
Start: 2018-10-19 | End: 2018-10-22 | Stop reason: HOSPADM

## 2018-10-18 RX ORDER — ACETAMINOPHEN 325 MG/1
975 TABLET ORAL ONCE
Status: DISCONTINUED | OUTPATIENT
Start: 2018-10-19 | End: 2018-10-22 | Stop reason: HOSPADM

## 2018-10-18 RX ORDER — METHYLPREDNISOLONE SODIUM SUCCINATE 40 MG/ML
40 INJECTION, POWDER, LYOPHILIZED, FOR SOLUTION INTRAMUSCULAR; INTRAVENOUS ONCE
Status: COMPLETED | OUTPATIENT
Start: 2018-10-19 | End: 2018-10-19

## 2018-10-19 ENCOUNTER — HOSPITAL ENCOUNTER (OUTPATIENT)
Dept: INFUSION CENTER | Facility: CLINIC | Age: 48
Discharge: HOME | End: 2018-10-19
Payer: COMMERCIAL

## 2018-10-19 VITALS
RESPIRATION RATE: 16 BRPM | HEART RATE: 79 BPM | TEMPERATURE: 97.8 F | SYSTOLIC BLOOD PRESSURE: 144 MMHG | DIASTOLIC BLOOD PRESSURE: 81 MMHG

## 2018-10-19 PROCEDURE — 96413 CHEMO IV INFUSION 1 HR: CPT

## 2018-10-19 PROCEDURE — 96375 TX/PRO/DX INJ NEW DRUG ADDON: CPT

## 2018-10-19 RX ADMIN — METHYLPREDNISOLONE SODIUM SUCCINATE 40 MG: 40 INJECTION, POWDER, FOR SOLUTION INTRAMUSCULAR; INTRAVENOUS at 14:13

## 2018-10-19 RX ADMIN — SODIUM CHLORIDE 20 ML/HR: 0.9 INJECTION, SOLUTION INTRAVENOUS at 14:10

## 2018-11-30 RX ORDER — DIPHENHYDRAMINE HCL 25 MG
25 TABLET ORAL ONCE
Status: DISCONTINUED | OUTPATIENT
Start: 2018-12-03 | End: 2018-12-06 | Stop reason: HOSPADM

## 2018-11-30 RX ORDER — ACETAMINOPHEN 325 MG/1
975 TABLET ORAL ONCE
Status: DISCONTINUED | OUTPATIENT
Start: 2018-12-03 | End: 2018-12-06 | Stop reason: HOSPADM

## 2018-11-30 RX ORDER — METHYLPREDNISOLONE SODIUM SUCCINATE 40 MG/ML
40 INJECTION, POWDER, LYOPHILIZED, FOR SOLUTION INTRAMUSCULAR; INTRAVENOUS ONCE
Status: COMPLETED | OUTPATIENT
Start: 2018-12-03 | End: 2018-12-03

## 2018-12-03 ENCOUNTER — HOSPITAL ENCOUNTER (OUTPATIENT)
Dept: INFUSION CENTER | Facility: CLINIC | Age: 48
Discharge: HOME | End: 2018-12-03
Payer: COMMERCIAL

## 2018-12-03 VITALS
TEMPERATURE: 97.6 F | SYSTOLIC BLOOD PRESSURE: 124 MMHG | HEART RATE: 71 BPM | RESPIRATION RATE: 16 BRPM | DIASTOLIC BLOOD PRESSURE: 79 MMHG

## 2018-12-03 PROCEDURE — 96372 THER/PROPH/DIAG INJ SC/IM: CPT

## 2018-12-03 PROCEDURE — 96413 CHEMO IV INFUSION 1 HR: CPT

## 2018-12-03 RX ORDER — SODIUM CHLORIDE 9 MG/ML
20 INJECTION, SOLUTION INTRAVENOUS CONTINUOUS
Status: DISCONTINUED | OUTPATIENT
Start: 2018-12-03 | End: 2018-12-06 | Stop reason: HOSPADM

## 2018-12-03 RX ADMIN — METHYLPREDNISOLONE SODIUM SUCCINATE 40 MG: 40 INJECTION, POWDER, FOR SOLUTION INTRAMUSCULAR; INTRAVENOUS at 14:38

## 2018-12-03 RX ADMIN — SODIUM CHLORIDE 20 ML/HR: 0.9 INJECTION, SOLUTION INTRAVENOUS at 14:34

## 2018-12-03 NOTE — PLAN OF CARE
Problem: Potential for Falls  Goal: Patient will remain free of falls  INTERVENTIONS:  - Assess patient frequently for physical needs  -  Identify cognitive and physical deficits and behaviors that affect risk of falls    -  [place] fall precautions as indicated by assessment   - Educate patient/family on patient safety including physical limitations  - Instruct patient to call for assistance with activity based on assessment  - Modify environment to reduce risk of injury  - Consider OT/PT consult to assist with strengthening/mobility   Outcome: Progressing

## 2018-12-03 NOTE — PROGRESS NOTES
Pt arrived to unit today without complaint  Pt received Entyvio as ordered, tolerated well, no adverse events  AVS declined, but pt did schedule his next appt  Pt left unit without questions/concerns

## 2018-12-18 ENCOUNTER — APPOINTMENT (OUTPATIENT)
Dept: LAB | Facility: MEDICAL CENTER | Age: 48
End: 2018-12-18
Payer: COMMERCIAL

## 2018-12-18 ENCOUNTER — OFFICE VISIT (OUTPATIENT)
Dept: GASTROENTEROLOGY | Facility: MEDICAL CENTER | Age: 48
End: 2018-12-18
Payer: COMMERCIAL

## 2018-12-18 VITALS
WEIGHT: 259 LBS | SYSTOLIC BLOOD PRESSURE: 128 MMHG | HEART RATE: 74 BPM | BODY MASS INDEX: 37.08 KG/M2 | TEMPERATURE: 98 F | HEIGHT: 70 IN | DIASTOLIC BLOOD PRESSURE: 82 MMHG

## 2018-12-18 DIAGNOSIS — K51.311 ULCERATIVE RECTOSIGMOIDITIS WITH RECTAL BLEEDING (HCC): ICD-10-CM

## 2018-12-18 DIAGNOSIS — R10.11 RUQ PAIN: ICD-10-CM

## 2018-12-18 DIAGNOSIS — K51.311 ULCERATIVE RECTOSIGMOIDITIS WITH RECTAL BLEEDING (HCC): Primary | ICD-10-CM

## 2018-12-18 LAB
ALBUMIN SERPL BCP-MCNC: 3.5 G/DL (ref 3.5–5)
ALP SERPL-CCNC: 67 U/L (ref 46–116)
ALT SERPL W P-5'-P-CCNC: 21 U/L (ref 12–78)
ANION GAP SERPL CALCULATED.3IONS-SCNC: 4 MMOL/L (ref 4–13)
AST SERPL W P-5'-P-CCNC: 18 U/L (ref 5–45)
BASOPHILS # BLD AUTO: 0.04 THOUSANDS/ΜL (ref 0–0.1)
BASOPHILS NFR BLD AUTO: 1 % (ref 0–1)
BILIRUB SERPL-MCNC: 0.23 MG/DL (ref 0.2–1)
BUN SERPL-MCNC: 10 MG/DL (ref 5–25)
CALCIUM SERPL-MCNC: 8.7 MG/DL (ref 8.3–10.1)
CHLORIDE SERPL-SCNC: 107 MMOL/L (ref 100–108)
CO2 SERPL-SCNC: 27 MMOL/L (ref 21–32)
CREAT SERPL-MCNC: 0.86 MG/DL (ref 0.6–1.3)
CRP SERPL QL: 11.8 MG/L
EOSINOPHIL # BLD AUTO: 0.22 THOUSAND/ΜL (ref 0–0.61)
EOSINOPHIL NFR BLD AUTO: 3 % (ref 0–6)
ERYTHROCYTE [DISTWIDTH] IN BLOOD BY AUTOMATED COUNT: 13.5 % (ref 11.6–15.1)
ERYTHROCYTE [SEDIMENTATION RATE] IN BLOOD: 28 MM/HOUR (ref 0–10)
GFR SERPL CREATININE-BSD FRML MDRD: 103 ML/MIN/1.73SQ M
GLUCOSE SERPL-MCNC: 85 MG/DL (ref 65–140)
HCT VFR BLD AUTO: 38.1 % (ref 36.5–49.3)
HGB BLD-MCNC: 11.9 G/DL (ref 12–17)
IMM GRANULOCYTES # BLD AUTO: 0.02 THOUSAND/UL (ref 0–0.2)
IMM GRANULOCYTES NFR BLD AUTO: 0 % (ref 0–2)
LYMPHOCYTES # BLD AUTO: 1.7 THOUSANDS/ΜL (ref 0.6–4.47)
LYMPHOCYTES NFR BLD AUTO: 21 % (ref 14–44)
MCH RBC QN AUTO: 26.3 PG (ref 26.8–34.3)
MCHC RBC AUTO-ENTMCNC: 31.2 G/DL (ref 31.4–37.4)
MCV RBC AUTO: 84 FL (ref 82–98)
MONOCYTES # BLD AUTO: 0.71 THOUSAND/ΜL (ref 0.17–1.22)
MONOCYTES NFR BLD AUTO: 9 % (ref 4–12)
NEUTROPHILS # BLD AUTO: 5.62 THOUSANDS/ΜL (ref 1.85–7.62)
NEUTS SEG NFR BLD AUTO: 66 % (ref 43–75)
NRBC BLD AUTO-RTO: 0 /100 WBCS
PLATELET # BLD AUTO: 317 THOUSANDS/UL (ref 149–390)
PMV BLD AUTO: 11.9 FL (ref 8.9–12.7)
POTASSIUM SERPL-SCNC: 4 MMOL/L (ref 3.5–5.3)
PROT SERPL-MCNC: 7.4 G/DL (ref 6.4–8.2)
RBC # BLD AUTO: 4.52 MILLION/UL (ref 3.88–5.62)
SODIUM SERPL-SCNC: 138 MMOL/L (ref 136–145)
WBC # BLD AUTO: 8.31 THOUSAND/UL (ref 4.31–10.16)

## 2018-12-18 PROCEDURE — 82397 CHEMILUMINESCENT ASSAY: CPT

## 2018-12-18 PROCEDURE — 85652 RBC SED RATE AUTOMATED: CPT

## 2018-12-18 PROCEDURE — 36415 COLL VENOUS BLD VENIPUNCTURE: CPT

## 2018-12-18 PROCEDURE — 80299 QUANTITATIVE ASSAY DRUG: CPT

## 2018-12-18 PROCEDURE — 86140 C-REACTIVE PROTEIN: CPT

## 2018-12-18 PROCEDURE — 85025 COMPLETE CBC W/AUTO DIFF WBC: CPT

## 2018-12-18 PROCEDURE — 99214 OFFICE O/P EST MOD 30 MIN: CPT | Performed by: PHYSICIAN ASSISTANT

## 2018-12-18 PROCEDURE — 80053 COMPREHEN METABOLIC PANEL: CPT

## 2018-12-18 RX ORDER — DIPHENOXYLATE HYDROCHLORIDE AND ATROPINE SULFATE 2.5; .025 MG/1; MG/1
1 TABLET ORAL 4 TIMES DAILY PRN
Qty: 120 TABLET | Refills: 5 | Status: SHIPPED | OUTPATIENT
Start: 2018-12-18 | End: 2020-09-22 | Stop reason: ALTCHOICE

## 2018-12-18 RX ORDER — DICYCLOMINE HCL 20 MG
20 TABLET ORAL EVERY 6 HOURS
Qty: 120 TABLET | Refills: 4 | Status: SHIPPED | OUTPATIENT
Start: 2018-12-18 | End: 2020-09-22 | Stop reason: ALTCHOICE

## 2018-12-18 NOTE — LETTER
December 18, 2018     Tiffanie Chauhan MD  [address] [facility] [address]    Patient: Arti Tirado   YOB: 1970   Date of Visit: 12/18/2018       Dear Dr Pond English: Thank you for referring Arti Tirado to me for evaluation  Below are my notes for this consultation  If you have questions, please do not hesitate to call me  I look forward to following your patient along with you  Sincerely,        Matteo Whitley PA-C        CC: No Recipients  Bozena Smiley  12/18/2018  1:11 PM  Sign at close encounter  Assessment/Plan:     Diagnoses and all orders for this visit:    Ulcerative rectosigmoiditis with rectal bleeding [facility])  He has history of ulcerative colitis and has been on Entyvio for more than a year  He feels that he is not getting much benefit from this medication even though it was increased from every 8 weeks to every 6 weeks  Am concerned that he may have developed antibodies and would recommend checking that as well as a drug level at this time  Even though he is not in a trough period since his last infusion was on 11/30 this may give us more information  Also recommend checking inflammatory markers and calprotectin to see if he is truly flaring  Also will check C diff to rule out infectious etiology  He has tried Imodium without good relief of his symptoms and therefore would recommend a trial of Lomotil  Based on the results we can determine if we need to increase the dose of his Entyvio or the frequency or if he needs to switch to a completely different medication   -     diphenoxylate-atropine (LOMOTIL) 2 5-0 025 mg per tablet; Take 1 tablet by mouth 4 (four) times a day as needed for diarrhea  -     C-reactive protein; Future  -     Sedimentation rate, automated; Future  -     Calprotectin,Fecal; Future  -     MISCELLANEOUS LAB TEST; Future  -     CBC and differential; Future   -     Comprehensive metabolic panel;  Future  -     Clostridium difficile toxin by PCR; Future    RUQ pain  He also has intermittent right upper quadrant pain  It was previously recommended that he have a right upper quadrant ultrasound however he states he never received a script  Unlikely that this is related to his gallbladder however would like to rule this out  Also would recommend trial of Bentyl to see if this is more related to spasm   -     US right upper quadrant; Future  -     dicyclomine (BENTYL) 20 mg tablet; Take 1 tablet (20 mg total) by mouth every 6 (six) hours    Wants to get his test results back we can determine when he needs to be seen next in the office  He also has not had a colonoscopy in more than a year however I feel at this time it most likely will show severe disease but if all testing is negative we can consider repeating  Subjective:      Patient ID: Ronny Chen is a 53 y o  male  HPI     This is a f/u for UC  He previously tried & failed Humira  He is currently on Entyvio & feels that despite increasing his dose from [id number] to [place] he continues with severe sxs  He states he has been having >20 BM's per day with bleeding & abd pain  He is also having episodes of incontinence, especially in the car if he can't get to a bathroom  He is also taking lialda  He states he was previously prescribed cortifoam which seemed to work well but states no pharmacy has them  They even called the  & couldn't get any answers  He is not currently on steroids but feels the last time he was, he had little benefits & it made him angry & caused insomnia  He also has pain in the RUQ  He states it can be worse after eating but sometimes comes on regardless on eating  His last lab work was in Oct & showed a hbg of 12 2 & normal LFT's  No inflammatory markers were checked  His last EGD was September 2017 which was within normal limits  Biopsies negative for H pylori and celiac    His last colonoscopywhich showed severe ulcerative colitis in the distal colon, 30 cm from the anal verge  Biopsy showed chronic proctitis with moderate to severe activity was an ulceration with acute cryptitis and crypt abscess  Patient Active Problem List   Diagnosis    Ulcerative colitis    Acute colitis    Anxiety about health    Transient insomnia    RUQ pain     No Known Allergies  Current Outpatient Prescriptions on File Prior to Visit   Medication Sig    ENTYVIO 300 MG SOLR RECONSTITUTE WITH 4 8 ML STERILE WATER  ADD 5 ML (300 MG)  ML NSAND INFUSE 300 MG INTRAVENOUSLY OVER 30 MINUTES EVERY 6 WEEKS  REFRIG    mesalamine (LIALDA) 1 2 g EC tablet Take 4 pills by mouth daily (Patient taking differently: Take 2 pills by mouth daily )    sertraline (ZOLOFT) 50 mg tablet Take 100 mg by mouth daily      hydrocortisone (CORTIFOAM) 10 % rectal foam Insert 1 applicator into the rectum 2 (two) times a day (Patient not taking: Reported on 12/18/2018 )    mesalamine (CANASA) 1,000 mg suppository Insert 1 suppository (1,000 mg total) into the rectum daily at bedtime (Patient not taking: Reported on 12/3/2018 )     No current facility-administered medications on file prior to visit  No family history on file  Past Medical History:   Diagnosis Date    Anxiety     Ulcerative colitis ([facility] [address] )      Social History     Social History    Marital status: /Civil Union     Spouse name: N/A    Number of children: N/A    Years of education: N/A     Occupational History     [employer]     Social History Main Topics    Smoking status: Former Smoker    Smokeless tobacco: Never Used    Alcohol use Yes      Comment: occationally    Drug use: No    Sexual activity: Not on file     Other Topics Concern    Not on file     Social History Narrative    No narrative on file     Past Surgical History:   Procedure Laterality Date    COLONOSCOPY N/A 4/4/2016    Procedure: COLONOSCOPY;  Surgeon: Julius Lozoya MD;  Location: [facility];   Service:     EGD AND COLONOSCOPY N/A 9/11/2017    Procedure: EGD AND COLONOSCOPY;  Surgeon: Nils Moritz, MD;  Location:  GI LAB; Service: Gastroenterology    HERNIA REPAIR      as infant         Review of Systems   Constitutional: Positive for unexpected weight change  All other systems reviewed and are negative  Objective:      /82 (BP Location: Left arm, Patient Position: Sitting, Cuff Size: Adult)   Pulse 74   Temp 98 °F (36 7 °C) (Tympanic)   Ht 5' 10" (1 778 m)   Wt 117 kg (259 lb)   BMI 37 16 kg/m²           Physical Exam   Constitutional: He is oriented to person, place, and time  He appears well-developed and well-nourished  No distress  HENT:   Head: Normocephalic and atraumatic  Eyes: Conjunctivae and EOM are normal    Neck: Normal range of motion  Cardiovascular: Normal rate and regular rhythm  Pulmonary/Chest: Effort normal and breath sounds normal    Abdominal: Soft  Bowel sounds are normal  He exhibits no distension  There is no tenderness  Musculoskeletal: Normal range of motion  Neurological: He is alert and oriented to person, place, and time  Skin: Skin is warm and dry  Psychiatric: He has a normal mood and affect   His behavior is normal

## 2018-12-18 NOTE — PROGRESS NOTES
Assessment/Plan:     Diagnoses and all orders for this visit:    Ulcerative rectosigmoiditis with rectal bleeding [facility])  He has history of ulcerative colitis and has been on Entyvio for more than a year  He feels that he is not getting much benefit from this medication even though it was increased from every 8 weeks to every 6 weeks  Am concerned that he may have developed antibodies and would recommend checking that as well as a drug level at this time  Even though he is not in a trough period since his last infusion was on 11/30 this may give us more information  Also recommend checking inflammatory markers and calprotectin to see if he is truly flaring  Also will check C diff to rule out infectious etiology  He has tried Imodium without good relief of his symptoms and therefore would recommend a trial of Lomotil  Based on the results we can determine if we need to increase the dose of his Entyvio or the frequency or if he needs to switch to a completely different medication  We did discuss prednisone as well however considering he did not feel much relief with this in the past and multiple side effects he wishes to not proceed with it at this time  -     diphenoxylate-atropine (LOMOTIL) 2 5-0 025 mg per tablet; Take 1 tablet by mouth 4 (four) times a day as needed for diarrhea  -     C-reactive protein; Future  -     Sedimentation rate, automated; Future  -     Calprotectin,Fecal; Future  -     MISCELLANEOUS LAB TEST; Future  -     CBC and differential; Future   -     Comprehensive metabolic panel; Future  -     Clostridium difficile toxin by PCR; Future    RUQ pain  He also has intermittent right upper quadrant pain  It was previously recommended that he have a right upper quadrant ultrasound however he states he never received a script  Unlikely that this is related to his gallbladder however would like to rule this out    Also would recommend trial of Bentyl to see if this is more related to spasm   -     US right upper quadrant; Future  -     dicyclomine (BENTYL) 20 mg tablet; Take 1 tablet (20 mg total) by mouth every 6 (six) hours    Wants to get his test results back we can determine when he needs to be seen next in the office  He also has not had a colonoscopy in more than a year however I feel at this time it most likely will show severe disease but if all testing is negative we can consider repeating  Subjective:      Patient ID: Randi Goodwin is a 53 y o  male  HPI     This is a f/u for UC  He previously tried & failed Humira  He is currently on Entyvio & feels that despite increasing his dose from [id number] to [place] he continues with severe sxs  He states he has been having >20 BM's per day with bleeding & abd pain  He is also having episodes of incontinence, especially in the car if he can't get to a bathroom  He is also taking lialda  He states he was previously prescribed cortifoam which seemed to work well but states no pharmacy has them  They even called the  & couldn't get any answers  He is not currently on steroids but feels the last time he was, he had little benefits & it made him angry & caused insomnia  He also has pain in the RUQ  He states it can be worse after eating but sometimes comes on regardless on eating  His last lab work was in Oct & showed a hbg of 12 2 & normal LFT's  No inflammatory markers were checked  His last EGD was September 2017 which was within normal limits  Biopsies negative for H pylori and celiac  His last colonoscopywhich showed severe ulcerative colitis in the distal colon, 30 cm from the anal verge  Biopsy showed chronic proctitis with moderate to severe activity was an ulceration with acute cryptitis and crypt abscess      Patient Active Problem List   Diagnosis    Ulcerative colitis    Acute colitis    Anxiety about health    Transient insomnia    RUQ pain     No Known Allergies  Current Outpatient Prescriptions on File Prior to Visit   Medication Sig    ENTYVIO 300 MG SOLR RECONSTITUTE WITH 4 8 ML STERILE WATER  ADD 5 ML (300 MG)  ML NSAND INFUSE 300 MG INTRAVENOUSLY OVER 30 MINUTES EVERY 6 WEEKS  REFRIG    mesalamine (LIALDA) 1 2 g EC tablet Take 4 pills by mouth daily (Patient taking differently: Take 2 pills by mouth daily )    sertraline (ZOLOFT) 50 mg tablet Take 100 mg by mouth daily      hydrocortisone (CORTIFOAM) 10 % rectal foam Insert 1 applicator into the rectum 2 (two) times a day (Patient not taking: Reported on 12/18/2018 )    mesalamine (CANASA) 1,000 mg suppository Insert 1 suppository (1,000 mg total) into the rectum daily at bedtime (Patient not taking: Reported on 12/3/2018 )     No current facility-administered medications on file prior to visit  No family history on file  Past Medical History:   Diagnosis Date    Anxiety     Ulcerative colitis ([facility] [address] )      Social History     Social History    Marital status: /Civil Union     Spouse name: N/A    Number of children: N/A    Years of education: N/A     Occupational History     [employer]     Social History Main Topics    Smoking status: Former Smoker    Smokeless tobacco: Never Used    Alcohol use Yes      Comment: occationally    Drug use: No    Sexual activity: Not on file     Other Topics Concern    Not on file     Social History Narrative    No narrative on file     Past Surgical History:   Procedure Laterality Date    COLONOSCOPY N/A 4/4/2016    Procedure: COLONOSCOPY;  Surgeon: Michelel Quiroga MD;  Location: [facility]; Service:     EGD AND COLONOSCOPY N/A 9/11/2017    Procedure: EGD AND COLONOSCOPY;  Surgeon: Michelle Quiroga MD;  [facility]; Service: Gastroenterology    HERNIA REPAIR      as infant         Review of Systems   Constitutional: Positive for unexpected weight change  All other systems reviewed and are negative          Objective:      /82 (BP Location: Left arm, Patient Position: Sitting, Cuff Size: Adult)   Pulse 74   Temp 98 °F (36 7 °C) (Tympanic)   Ht 5' 10" (1 778 m)   Wt 117 kg (259 lb)   BMI 37 16 kg/m²          Physical Exam   Constitutional: He is oriented to person, place, and time  He appears well-developed and well-nourished  No distress  HENT:   Head: Normocephalic and atraumatic  Eyes: Conjunctivae and EOM are normal    Neck: Normal range of motion  Cardiovascular: Normal rate and regular rhythm  Pulmonary/Chest: Effort normal and breath sounds normal    Abdominal: Soft  Bowel sounds are normal  He exhibits no distension  There is no tenderness  Musculoskeletal: Normal range of motion  Neurological: He is alert and oriented to person, place, and time  Skin: Skin is warm and dry  Psychiatric: He has a normal mood and affect   His behavior is normal

## 2018-12-19 ENCOUNTER — TELEPHONE (OUTPATIENT)
Dept: GASTROENTEROLOGY | Facility: MEDICAL CENTER | Age: 48
End: 2018-12-19

## 2018-12-19 ENCOUNTER — HOSPITAL ENCOUNTER (OUTPATIENT)
Dept: ULTRASOUND IMAGING | Facility: MEDICAL CENTER | Age: 48
Discharge: HOME | End: 2018-12-19
Payer: COMMERCIAL

## 2018-12-19 DIAGNOSIS — R10.11 RUQ PAIN: ICD-10-CM

## 2018-12-19 PROCEDURE — 76705 ECHO EXAM OF ABDOMEN: CPT

## 2018-12-19 NOTE — TELEPHONE ENCOUNTER
----- Message from Sandra Cali PA-C sent at 12/19/2018 11:46 AM EST -----  Inflammatory markers are elevated  Await special tests   Lesvia Vences

## 2018-12-23 ENCOUNTER — APPOINTMENT (OUTPATIENT)
Dept: LAB | Facility: MEDICAL CENTER | Age: 48
End: 2018-12-23
Payer: COMMERCIAL

## 2018-12-23 DIAGNOSIS — K51.311 ULCERATIVE RECTOSIGMOIDITIS WITH RECTAL BLEEDING (HCC): ICD-10-CM

## 2018-12-23 PROCEDURE — 87493 C DIFF AMPLIFIED PROBE: CPT

## 2018-12-23 PROCEDURE — 83993 ASSAY FOR CALPROTECTIN FECAL: CPT

## 2018-12-24 ENCOUNTER — DOCUMENTATION (OUTPATIENT)
Dept: GASTROENTEROLOGY | Facility: CLINIC | Age: 48
End: 2018-12-24

## 2018-12-24 DIAGNOSIS — K51.30 ULCERATIVE RECTOSIGMOIDITIS WITHOUT COMPLICATION (HCC): ICD-10-CM

## 2018-12-24 LAB
C DIFF TOX GENS STL QL NAA+PROBE: NORMAL
MISCELLANEOUS LAB TEST RESULT: NORMAL

## 2018-12-26 ENCOUNTER — TELEPHONE (OUTPATIENT)
Dept: GASTROENTEROLOGY | Facility: AMBULARY SURGERY CENTER | Age: 48
End: 2018-12-26

## 2018-12-26 DIAGNOSIS — K51.311 ULCERATIVE RECTOSIGMOIDITIS WITH RECTAL BLEEDING (HCC): Primary | ICD-10-CM

## 2018-12-26 RX ORDER — METRONIDAZOLE 500 MG/1
500 TABLET ORAL EVERY 8 HOURS SCHEDULED
Qty: 30 TABLET | Refills: 0 | Status: SHIPPED | OUTPATIENT
Start: 2018-12-26 | End: 2019-01-05

## 2018-12-26 RX ORDER — PREDNISONE 10 MG/1
TABLET ORAL
Qty: 70 TABLET | Refills: 0 | Status: SHIPPED | OUTPATIENT
Start: 2018-12-26 | End: 2020-07-28 | Stop reason: HOSPADM

## 2018-12-26 RX ORDER — HYDROCORTISONE 100 MG/60ML
100 SUSPENSION RECTAL
Qty: 30 ENEMA | Refills: 5 | Status: SHIPPED | OUTPATIENT
Start: 2018-12-26 | End: 2020-09-22 | Stop reason: ALTCHOICE

## 2018-12-28 NOTE — PROGRESS NOTES
Estrada landaverde-auth is approved for four months, pa number is [id number]  They are faxing over approval letter

## 2018-12-29 LAB — CALPROTECTIN STL-MCNT: 600 UG/G (ref 0–120)

## 2019-01-07 ENCOUNTER — TELEPHONE (OUTPATIENT)
Dept: GASTROENTEROLOGY | Facility: CLINIC | Age: 49
End: 2019-01-07

## 2019-01-07 NOTE — TELEPHONE ENCOUNTER
----- Message from Patricia Whitfield MD sent at 1/7/2019 12:31 PM EST -----  Regarding: Entyvio dosing  Clau,    Please check if his Entyvio dose can be increased or if his interval can be shortened to 4 weeks  Thanks!     Calvin Maldonado

## 2019-01-09 ENCOUNTER — TELEPHONE (OUTPATIENT)
Dept: GASTROENTEROLOGY | Facility: CLINIC | Age: 49
End: 2019-01-09

## 2019-01-11 RX ORDER — ACETAMINOPHEN 325 MG/1
975 TABLET ORAL ONCE
Status: DISCONTINUED | OUTPATIENT
Start: 2019-01-14 | End: 2019-01-17 | Stop reason: HOSPADM

## 2019-01-11 RX ORDER — METHYLPREDNISOLONE SODIUM SUCCINATE 40 MG/ML
40 INJECTION, POWDER, LYOPHILIZED, FOR SOLUTION INTRAMUSCULAR; INTRAVENOUS ONCE
Status: COMPLETED | OUTPATIENT
Start: 2019-01-14 | End: 2019-01-14

## 2019-01-11 RX ORDER — DIPHENHYDRAMINE HCL 25 MG
25 TABLET ORAL ONCE
Status: DISCONTINUED | OUTPATIENT
Start: 2019-01-14 | End: 2019-01-17 | Stop reason: HOSPADM

## 2019-01-11 RX ORDER — SODIUM CHLORIDE 9 MG/ML
20 INJECTION, SOLUTION INTRAVENOUS CONTINUOUS
Status: DISCONTINUED | OUTPATIENT
Start: 2019-01-14 | End: 2019-01-17 | Stop reason: HOSPADM

## 2019-01-11 NOTE — TELEPHONE ENCOUNTER
--Dr Maria C Bui, just preethi --    Received letter of approval for Entyvio q 4 weeks  Called [facility] to confirm that this was for every 4 weeks and not for every 6  Spoke with Spike Cuellar, certified rep  She informed me that the auth for every 4 weeks was approved  Gastro provider pool,     --Please enter and print new script for Entyvio q 4 weeks so I can fax script to [facility]  Please print to [place] office if possible  Thank you  --

## 2019-01-14 ENCOUNTER — HOSPITAL ENCOUNTER (OUTPATIENT)
Dept: INFUSION CENTER | Facility: CLINIC | Age: 49
Discharge: HOME | End: 2019-01-14
Payer: COMMERCIAL

## 2019-01-14 VITALS
RESPIRATION RATE: 18 BRPM | DIASTOLIC BLOOD PRESSURE: 81 MMHG | TEMPERATURE: 97.5 F | HEART RATE: 73 BPM | SYSTOLIC BLOOD PRESSURE: 124 MMHG

## 2019-01-14 PROCEDURE — 96375 TX/PRO/DX INJ NEW DRUG ADDON: CPT

## 2019-01-14 PROCEDURE — 96413 CHEMO IV INFUSION 1 HR: CPT

## 2019-01-14 RX ADMIN — METHYLPREDNISOLONE SODIUM SUCCINATE 40 MG: 40 INJECTION, POWDER, FOR SOLUTION INTRAMUSCULAR; INTRAVENOUS at 14:16

## 2019-01-14 RX ADMIN — SODIUM CHLORIDE 20 ML/HR: 0.9 INJECTION, SOLUTION INTRAVENOUS at 14:16

## 2019-01-14 NOTE — PROGRESS NOTES
Pt tolerated entyvio today without incident  Pt made appt for 4 weeks  Pt states his doctor's office is supposed to send a new script to reflect the time change    Pt was provided with AVS

## 2019-01-14 NOTE — PLAN OF CARE
Problem: Potential for Falls  Goal: Patient will remain free of falls  INTERVENTIONS:  - Assess patient frequently for physical needs  -  Identify cognitive and physical deficits and behaviors that affect risk of falls    -  [place] fall precautions as indicated by assessment   - Educate patient/family on patient safety including physical limitations  - Instruct patient to call for assistance with activity based on assessment  - Modify environment to reduce risk of injury  - Consider OT/PT consult to assist with strengthening/mobility   Outcome: Progressing

## 2019-01-15 NOTE — TELEPHONE ENCOUNTER
Pt's wife Latanya [facility] called stating we need to contact the [facility] to inform them that pt's entyvio has been approved for 4 weeks   Latanya [facility] can be reached at [phone]

## 2019-01-16 DIAGNOSIS — K51.30 ULCERATIVE RECTOSIGMOIDITIS WITHOUT COMPLICATION (HCC): ICD-10-CM

## 2019-01-16 NOTE — TELEPHONE ENCOUNTER
Called [facility] at [phone]  to confirm Genia Vasquez for q 4 weeks, Aleksandra Lezama is approved  Transferred to pharmacist to give a verbal order, spoke with pharmacist to provide verbal     Palmyra Embs back, informed her that all is taken care of, she verbalized understanding  I asked her if she needed anything else to call the office at any time

## 2019-01-16 NOTE — TELEPHONE ENCOUNTER
Called [facility] and spoke with Yessy, I informed her that infusion is every 4 weeks  She confirmed that they already have this on file  I will call pharmacy to confirm again the infusion was approved for [id number] and then call patient's wife to reassure her of this

## 2019-01-21 ENCOUNTER — TELEPHONE (OUTPATIENT)
Dept: GASTROENTEROLOGY | Facility: CLINIC | Age: 49
End: 2019-01-21

## 2019-01-23 ENCOUNTER — TELEPHONE (OUTPATIENT)
Dept: GASTROENTEROLOGY | Facility: CLINIC | Age: 49
End: 2019-01-23

## 2019-01-23 NOTE — TELEPHONE ENCOUNTER
Maria C Bui pt    Pamela - please call the pt back in regards to a letter he recevied pertaining his Carolina Garcia for [place] [phone]

## 2019-01-24 NOTE — TELEPHONE ENCOUNTER
Spoke with patient, explained everything to him  I informed him that I will start the new auth now and from here on out I will begin the new auth 30 days in advance as this I the soonest we can start auths  I also informed him I will call him and let him know once it's approved just so he isn't worrying about it  He verbalized understanding

## 2019-01-24 NOTE — TELEPHONE ENCOUNTER
Spoke with Ghulam Vázquez from [employer], she states there is note on the Carolina Garcia approval stating they can only approve one dose of Entyvio once every 30 days and we will need to do a new auth every 30 days  The current auth for [facility] expires now on 2/7/2019 and the auth number is [id number]

## 2019-01-24 NOTE — TELEPHONE ENCOUNTER
Called [facility] to enquire about this, spoke with Lashon  She states their computers are currently down and will return my call once the computers are back up and she is able to provide me with more information

## 2019-01-28 ENCOUNTER — TELEPHONE (OUTPATIENT)
Dept: GASTROENTEROLOGY | Facility: CLINIC | Age: 49
End: 2019-01-28

## 2019-01-28 NOTE — TELEPHONE ENCOUNTER
Mariann  pt    Bladimir Turcios - pts spouse was calling for Pamela   Please return her call [phone]

## 2019-01-31 NOTE — TELEPHONE ENCOUNTER
Quentin scheduled 3/4/19 with rEic Moreau at [address] instructions given over the phone and mailed to home address   Suprep sent to [employer]

## 2019-01-31 NOTE — TELEPHONE ENCOUNTER
Called [employer] to check status, Ambrocio De Guzman is still pending  I called patient's wife to update her  I told her I will be out of the office tomorrow and but I will call again on Monday to get an update  Once I get the update from the insurance, I will call her and update her  She verbalized understanding

## 2019-02-05 NOTE — TELEPHONE ENCOUNTER
Called [employer] to check status, Denia Sawyer is still pending  Representative states it can take a total of 15 days from the date paperwork was received  I explained to rep that patient is scheduled for his next infusion date and he can not miss it  Called patient's wife to update her, she verbalized understanding and states she is going to call the insurance company to get them to speed up the process  She states she will call me back tomorrow to update me

## 2019-02-06 NOTE — TELEPHONE ENCOUNTER
Patient's wife, Cassondra Severin, called to advise that she just got off the phone with the insurance company and states she was informed that the [address] [place] [address] Street was approved and scheduled to be delivered on 2/8  I will call tomorrow to get the exact validity dates and the authorization number

## 2019-02-07 ENCOUNTER — TELEPHONE (OUTPATIENT)
Dept: GASTROENTEROLOGY | Facility: MEDICAL CENTER | Age: 49
End: 2019-02-07

## 2019-02-07 NOTE — TELEPHONE ENCOUNTER
Called patient's insurance and spoke with a representative  She states the [facility] however she sent an urgent email to the correct department marking this case as urgent  She states they should call or fax an approval within 24-48 hours

## 2019-02-07 NOTE — TELEPHONE ENCOUNTER
Dr Bond patient   [facility] stating patients current enyvio order is for every 6 weeks but patient is saying he should have it ever 4 weeks  If patient should be scheduled every 4 weeks infusion will need a new order to state that   Infusion phone number [phone] fax number [phone]

## 2019-02-08 NOTE — TELEPHONE ENCOUNTER
New order faxed to [facility]  Called infusion center to confirm it was received  Spoke with Abigail Choudhury, it was received

## 2019-02-11 ENCOUNTER — HOSPITAL ENCOUNTER (OUTPATIENT)
Dept: INFUSION CENTER | Facility: CLINIC | Age: 49
Discharge: HOME | End: 2019-02-11
Payer: COMMERCIAL

## 2019-02-11 PROCEDURE — 96413 CHEMO IV INFUSION 1 HR: CPT

## 2019-02-11 PROCEDURE — 96375 TX/PRO/DX INJ NEW DRUG ADDON: CPT

## 2019-02-11 RX ORDER — SODIUM CHLORIDE 9 MG/ML
20 INJECTION, SOLUTION INTRAVENOUS CONTINUOUS
Status: DISCONTINUED | OUTPATIENT
Start: 2019-02-11 | End: 2019-02-14 | Stop reason: HOSPADM

## 2019-02-11 RX ORDER — METHYLPREDNISOLONE SODIUM SUCCINATE 40 MG/ML
40 INJECTION, POWDER, LYOPHILIZED, FOR SOLUTION INTRAMUSCULAR; INTRAVENOUS ONCE
Status: COMPLETED | OUTPATIENT
Start: 2019-02-11 | End: 2019-02-11

## 2019-02-11 RX ORDER — DIPHENHYDRAMINE HCL 25 MG
25 TABLET ORAL ONCE
Status: DISCONTINUED | OUTPATIENT
Start: 2019-02-11 | End: 2019-02-14 | Stop reason: HOSPADM

## 2019-02-11 RX ORDER — ACETAMINOPHEN 325 MG/1
975 TABLET ORAL ONCE
Status: DISCONTINUED | OUTPATIENT
Start: 2019-02-11 | End: 2019-02-14 | Stop reason: HOSPADM

## 2019-02-11 RX ADMIN — VEDOLIZUMAB 300 MG: 300 INJECTION, POWDER, LYOPHILIZED, FOR SOLUTION INTRAVENOUS at 14:53

## 2019-02-11 RX ADMIN — METHYLPREDNISOLONE SODIUM SUCCINATE 40 MG: 40 INJECTION, POWDER, FOR SOLUTION INTRAMUSCULAR; INTRAVENOUS at 14:30

## 2019-02-11 RX ADMIN — SODIUM CHLORIDE 20 ML/HR: 0.9 INJECTION, SOLUTION INTRAVENOUS at 14:30

## 2019-02-11 NOTE — PLAN OF CARE
Problem: Potential for Falls  Goal: Patient will remain free of falls  Description  INTERVENTIONS:  - Assess patient frequently for physical needs  -  Identify cognitive and physical deficits and behaviors that affect risk of falls    -  [place] fall precautions as indicated by assessment   - Educate patient/family on patient safety including physical limitations  - Instruct patient to call for assistance with activity based on assessment  - Modify environment to reduce risk of injury  - Consider OT/PT consult to assist with strengthening/mobility  Outcome: Progressing

## 2019-02-11 NOTE — TELEPHONE ENCOUNTER
Called insurance company to check status, after waiting on hold for 35 minutes, I was given a different phone number to call for a different department this case has now been transferred to  The phone number provided to me was [phone]  I left a message on the machine, I will call again tomorrow to follow up

## 2019-02-11 NOTE — PROGRESS NOTES
Pt without complaint, denies any new or recent infections, Entyvio administered as ordered and tolerated well without adverse reaction  Pt declines AVS, aware of all future appts

## 2019-02-13 DIAGNOSIS — K51.311 ULCERATIVE RECTOSIGMOIDITIS WITH RECTAL BLEEDING (HCC): ICD-10-CM

## 2019-02-13 NOTE — TELEPHONE ENCOUNTER
Called number again, [facility] requesting an immediate call back in regards to the prior auth

## 2019-02-14 NOTE — TELEPHONE ENCOUNTER
Called patient's insurance company again, the infusions are approved 2/7/2019 - 1/7/2020, auth number is [id number]  Called [facility] [employer] @ [phone], spoke with [facility], certified rep  She states the medication is approved from 12/28/2018-4/28/2019  Transferred to [facility] [place] office @ [phone] to schedule patient's delivery of Entyvio for next infusion date of 3/11/2019  Pharmacy could not schedule delivery because they typically speak with the patient  They made a note on the patient's account that he is scheduled for next infusion on 3/11/2019 and will reach out prior to that date for the patient to schedule his delivery  LMOM informing patient's wife

## 2019-02-25 PROBLEM — K51.30 ULCERATIVE RECTOSIGMOIDITIS WITHOUT COMPLICATION (HCC): Status: ACTIVE | Noted: 2019-02-25

## 2019-03-08 ENCOUNTER — TELEPHONE (OUTPATIENT)
Dept: GASTROENTEROLOGY | Facility: AMBULARY SURGERY CENTER | Age: 49
End: 2019-03-08

## 2019-03-08 DIAGNOSIS — K51.30 ULCERATIVE RECTOSIGMOIDITIS WITHOUT COMPLICATION (HCC): Primary | ICD-10-CM

## 2019-03-08 RX ORDER — METHYLPREDNISOLONE SODIUM SUCCINATE 40 MG/ML
40 INJECTION, POWDER, LYOPHILIZED, FOR SOLUTION INTRAMUSCULAR; INTRAVENOUS ONCE
Status: COMPLETED | OUTPATIENT
Start: 2019-03-11 | End: 2019-03-11

## 2019-03-08 RX ORDER — DIPHENHYDRAMINE HCL 25 MG
25 TABLET ORAL ONCE
Status: DISCONTINUED | OUTPATIENT
Start: 2019-03-11 | End: 2019-03-14 | Stop reason: HOSPADM

## 2019-03-08 RX ORDER — SODIUM CHLORIDE 9 MG/ML
20 INJECTION, SOLUTION INTRAVENOUS CONTINUOUS
Status: DISCONTINUED | OUTPATIENT
Start: 2019-03-11 | End: 2019-03-14 | Stop reason: HOSPADM

## 2019-03-08 RX ORDER — ACETAMINOPHEN 325 MG/1
975 TABLET ORAL ONCE
Status: DISCONTINUED | OUTPATIENT
Start: 2019-03-11 | End: 2019-03-14 | Stop reason: HOSPADM

## 2019-03-11 ENCOUNTER — HOSPITAL ENCOUNTER (OUTPATIENT)
Dept: INFUSION CENTER | Facility: CLINIC | Age: 49
Discharge: HOME | End: 2019-03-11
Payer: COMMERCIAL

## 2019-03-11 VITALS
SYSTOLIC BLOOD PRESSURE: 130 MMHG | DIASTOLIC BLOOD PRESSURE: 77 MMHG | TEMPERATURE: 98.3 F | RESPIRATION RATE: 16 BRPM | HEART RATE: 88 BPM

## 2019-03-11 PROCEDURE — 96413 CHEMO IV INFUSION 1 HR: CPT

## 2019-03-11 PROCEDURE — 96375 TX/PRO/DX INJ NEW DRUG ADDON: CPT

## 2019-03-11 RX ADMIN — VEDOLIZUMAB 300 MG: 300 INJECTION, POWDER, LYOPHILIZED, FOR SOLUTION INTRAVENOUS at 14:40

## 2019-03-11 RX ADMIN — SODIUM CHLORIDE 20 ML/HR: 0.9 INJECTION, SOLUTION INTRAVENOUS at 14:25

## 2019-03-11 RX ADMIN — METHYLPREDNISOLONE SODIUM SUCCINATE 40 MG: 40 INJECTION, POWDER, FOR SOLUTION INTRAMUSCULAR; INTRAVENOUS at 14:30

## 2019-03-11 NOTE — PLAN OF CARE
Problem: PAIN - ADULT  Goal: Verbalizes/displays adequate comfort level or baseline comfort level  Description  Interventions:  - Encourage patient to monitor pain and request assistance  - Assess pain using appropriate pain scale  - Administer analgesics based on type and severity of pain and evaluate response  - Implement non-pharmacological measures as appropriate and evaluate response  - Consider cultural and social influences on pain and pain management  - Notify physician/advanced practitioner if interventions unsuccessful or patient reports new pain  Outcome: Progressing     Problem: INFECTION - ADULT  Goal: Absence or prevention of progression during hospitalization  Description  INTERVENTIONS:  - Assess and monitor for signs and symptoms of infection  - Monitor lab/diagnostic results  - Monitor all insertion sites, i e  indwelling lines, tubes, and drains  - Monitor endotracheal (as able) and nasal secretions for changes in amount and color  - [place] appropriate cooling/warming therapies per order  - Administer medications as ordered  - Instruct and encourage patient and family to use good hand hygiene technique  - Identify and instruct in appropriate isolation precautions for identified infection/condition  Outcome: Progressing  Goal: Absence of fever/infection during neutropenic period  Description  INTERVENTIONS:  - Monitor WBC  - Implement neutropenic guidelines  Outcome: Progressing     Problem: Knowledge Deficit  Goal: Patient/family/caregiver demonstrates understanding of disease process, treatment plan, medications, and discharge instructions  Description  Complete learning assessment and assess knowledge base    Interventions:  - Provide teaching at level of understanding  - Provide teaching via preferred learning methods  Outcome: Progressing

## 2019-03-11 NOTE — PLAN OF CARE
Problem: PAIN - ADULT  Goal: Verbalizes/displays adequate comfort level or baseline comfort level  Description  Interventions:  - Encourage patient to monitor pain and request assistance  - Assess pain using appropriate pain scale  - Administer analgesics based on type and severity of pain and evaluate response  - Implement non-pharmacological measures as appropriate and evaluate response  - Consider cultural and social influences on pain and pain management  - Notify physician/advanced practitioner if interventions unsuccessful or patient reports new pain  Outcome: Progressing     Problem: INFECTION - ADULT  Goal: Absence or prevention of progression during hospitalization  Description  INTERVENTIONS:  - Assess and monitor for signs and symptoms of infection  - Monitor lab/diagnostic results  - Monitor all insertion sites, i e  indwelling lines, tubes, and drains  - Monitor endotracheal (as able) and nasal secretions for changes in amount and color  - [place] appropriate cooling/warming therapies per order  - Administer medications as ordered  - Instruct and encourage patient and family to use good hand hygiene technique  - Identify and instruct in appropriate isolation precautions for identified infection/condition  Outcome: Progressing  Goal: Absence of fever/infection during neutropenic period  Description  INTERVENTIONS:  - Monitor WBC  - Implement neutropenic guidelines  Outcome: Progressing     Problem: Knowledge Deficit  Goal: Patient/family/caregiver demonstrates understanding of disease process, treatment plan, medications, and discharge instructions  Description  Complete learning assessment and assess knowledge base    Interventions:  - Provide teaching at level of understanding  - Provide teaching via preferred learning methods  Outcome: Progressing

## 2019-03-11 NOTE — PROGRESS NOTES
Pt  Tolerated Entyvio without adverse event  Future appointments scheduled as ordered  AVS provided

## 2019-03-11 NOTE — PROGRESS NOTES
Pt  Denies new symptoms or concerns at this time  Pt  Verbalized taking Benadryl and Tylenol at home  Solumedrol 40 mg given IV as ordered

## 2019-03-28 ENCOUNTER — ANESTHESIA EVENT (OUTPATIENT)
Dept: GASTROENTEROLOGY | Facility: MEDICAL CENTER | Age: 49
End: 2019-03-28
Payer: COMMERCIAL

## 2019-03-29 ENCOUNTER — ANESTHESIA (OUTPATIENT)
Dept: GASTROENTEROLOGY | Facility: MEDICAL CENTER | Age: 49
End: 2019-03-29
Payer: COMMERCIAL

## 2019-03-29 ENCOUNTER — HOSPITAL ENCOUNTER (OUTPATIENT)
Facility: MEDICAL CENTER | Age: 49
Setting detail: OUTPATIENT SURGERY
Discharge: HOME | End: 2019-03-29
Attending: INTERNAL MEDICINE | Admitting: INTERNAL MEDICINE
Payer: COMMERCIAL

## 2019-03-29 VITALS
TEMPERATURE: 97.6 F | SYSTOLIC BLOOD PRESSURE: 119 MMHG | WEIGHT: 265 LBS | OXYGEN SATURATION: 98 % | BODY MASS INDEX: 37.94 KG/M2 | RESPIRATION RATE: 22 BRPM | HEART RATE: 80 BPM | HEIGHT: 70 IN | DIASTOLIC BLOOD PRESSURE: 72 MMHG

## 2019-03-29 DIAGNOSIS — R10.11 RUQ PAIN: Primary | ICD-10-CM

## 2019-03-29 DIAGNOSIS — K51.30 ULCERATIVE RECTOSIGMOIDITIS WITHOUT COMPLICATION (HCC): ICD-10-CM

## 2019-03-29 PROCEDURE — 88305 TISSUE EXAM BY PATHOLOGIST: CPT | Performed by: PATHOLOGY

## 2019-03-29 PROCEDURE — 87252 VIRUS INOCULATION TISSUE: CPT | Performed by: INTERNAL MEDICINE

## 2019-03-29 PROCEDURE — 45380 COLONOSCOPY AND BIOPSY: CPT | Performed by: INTERNAL MEDICINE

## 2019-03-29 RX ORDER — PROPOFOL 10 MG/ML
INJECTION, EMULSION INTRAVENOUS AS NEEDED
Status: DISCONTINUED | OUTPATIENT
Start: 2019-03-29 | End: 2019-03-29 | Stop reason: SURG

## 2019-03-29 RX ORDER — LIDOCAINE HYDROCHLORIDE 20 MG/ML
INJECTION, SOLUTION EPIDURAL; INFILTRATION; INTRACAUDAL; PERINEURAL AS NEEDED
Status: DISCONTINUED | OUTPATIENT
Start: 2019-03-29 | End: 2019-03-29 | Stop reason: SURG

## 2019-03-29 RX ORDER — SODIUM CHLORIDE 9 MG/ML
125 INJECTION, SOLUTION INTRAVENOUS CONTINUOUS
Status: DISCONTINUED | OUTPATIENT
Start: 2019-03-29 | End: 2019-03-29 | Stop reason: HOSPADM

## 2019-03-29 RX ORDER — ONDANSETRON 2 MG/ML
INJECTION INTRAMUSCULAR; INTRAVENOUS AS NEEDED
Status: DISCONTINUED | OUTPATIENT
Start: 2019-03-29 | End: 2019-03-29 | Stop reason: SURG

## 2019-03-29 RX ADMIN — PROPOFOL 50 MG: 10 INJECTION, EMULSION INTRAVENOUS at 14:06

## 2019-03-29 RX ADMIN — PROPOFOL 50 MG: 10 INJECTION, EMULSION INTRAVENOUS at 13:59

## 2019-03-29 RX ADMIN — ONDANSETRON 4 MG: 2 INJECTION INTRAMUSCULAR; INTRAVENOUS at 14:19

## 2019-03-29 RX ADMIN — PROPOFOL 50 MG: 10 INJECTION, EMULSION INTRAVENOUS at 14:11

## 2019-03-29 RX ADMIN — LIDOCAINE HYDROCHLORIDE 100 MG: 20 INJECTION, SOLUTION EPIDURAL; INFILTRATION; INTRACAUDAL; PERINEURAL at 13:57

## 2019-03-29 RX ADMIN — PROPOFOL 50 MG: 10 INJECTION, EMULSION INTRAVENOUS at 14:04

## 2019-03-29 RX ADMIN — SODIUM CHLORIDE 125 ML/HR: 0.9 INJECTION, SOLUTION INTRAVENOUS at 13:23

## 2019-03-29 RX ADMIN — PROPOFOL 150 MG: 10 INJECTION, EMULSION INTRAVENOUS at 13:57

## 2019-03-29 NOTE — ANESTHESIA PREPROCEDURE EVALUATION
Review of Systems/Medical History  Patient summary reviewed  Chart reviewed      Cardiovascular  Negative cardio ROS    Pulmonary  Negative pulmonary ROS        GI/Hepatic    Bowel prep  Comment: Ulcerative Colitis     Negative  ROS        Endo/Other  Negative endo/other ROS      GYN       Hematology  Negative hematology ROS      Musculoskeletal  Negative musculoskeletal ROS        Neurology  Negative neurology ROS      Psychology   Depression , being treated for depression,              Physical Exam    Airway    Mallampati score: III  TM Distance: >3 FB  Neck ROM: full     Dental   No notable dental hx     Cardiovascular  Comment: Negative ROS, Rhythm: regular, Rate: normal, Cardiovascular exam normal    Pulmonary  Pulmonary exam normal Breath sounds clear to auscultation,     Other Findings        Anesthesia Plan  ASA Score- 2     Anesthesia Type- IV sedation with anesthesia with ASA Monitors  Additional Monitors:   Airway Plan:         Plan Factors- Patient instructed to abstain from smoking on day of procedure  Patient did not smoke on day of surgery  Induction- intravenous  Postoperative Plan-     Informed Consent- Anesthetic plan and risks discussed with patient

## 2019-03-29 NOTE — ANESTHESIA POSTPROCEDURE EVALUATION
Post-Op Assessment Note    CV Status:  Stable  Pain Score: 0    Pain management: adequate     Mental Status:  Alert and awake   Hydration Status:  Euvolemic   PONV Controlled:  Controlled   Airway Patency:  Patent   Post Op Vitals Reviewed: Yes      Staff: Anesthesiologist           /72 (03/29/19 1433)    Temp      Pulse 80 (03/29/19 1433)   Resp 22 (03/29/19 1433)    SpO2 98 % (03/29/19 1433)

## 2019-04-01 ENCOUNTER — TELEPHONE (OUTPATIENT)
Dept: GASTROENTEROLOGY | Facility: AMBULARY SURGERY CENTER | Age: 49
End: 2019-04-01

## 2019-04-01 ENCOUNTER — TELEPHONE (OUTPATIENT)
Dept: GASTROENTEROLOGY | Facility: CLINIC | Age: 49
End: 2019-04-01

## 2019-04-03 ENCOUNTER — TELEPHONE (OUTPATIENT)
Dept: GASTROENTEROLOGY | Facility: AMBULARY SURGERY CENTER | Age: 49
End: 2019-04-03

## 2019-04-08 ENCOUNTER — HOSPITAL ENCOUNTER (OUTPATIENT)
Dept: INFUSION CENTER | Facility: CLINIC | Age: 49
Discharge: HOME | End: 2019-04-08
Payer: COMMERCIAL

## 2019-04-08 VITALS
TEMPERATURE: 98.6 F | HEART RATE: 80 BPM | RESPIRATION RATE: 18 BRPM | SYSTOLIC BLOOD PRESSURE: 131 MMHG | DIASTOLIC BLOOD PRESSURE: 80 MMHG

## 2019-04-08 PROCEDURE — 96413 CHEMO IV INFUSION 1 HR: CPT

## 2019-04-08 PROCEDURE — 96375 TX/PRO/DX INJ NEW DRUG ADDON: CPT

## 2019-04-08 RX ORDER — ACETAMINOPHEN 325 MG/1
975 TABLET ORAL ONCE
Status: DISCONTINUED | OUTPATIENT
Start: 2019-04-08 | End: 2019-04-11 | Stop reason: HOSPADM

## 2019-04-08 RX ORDER — DIPHENHYDRAMINE HCL 25 MG
25 TABLET ORAL ONCE
Status: DISCONTINUED | OUTPATIENT
Start: 2019-04-08 | End: 2019-04-11 | Stop reason: HOSPADM

## 2019-04-08 RX ORDER — METHYLPREDNISOLONE SODIUM SUCCINATE 40 MG/ML
40 INJECTION, POWDER, LYOPHILIZED, FOR SOLUTION INTRAMUSCULAR; INTRAVENOUS ONCE
Status: COMPLETED | OUTPATIENT
Start: 2019-04-08 | End: 2019-04-08

## 2019-04-08 RX ORDER — SODIUM CHLORIDE 9 MG/ML
20 INJECTION, SOLUTION INTRAVENOUS CONTINUOUS
Status: DISCONTINUED | OUTPATIENT
Start: 2019-04-08 | End: 2019-04-11 | Stop reason: HOSPADM

## 2019-04-08 RX ADMIN — METHYLPREDNISOLONE SODIUM SUCCINATE 40 MG: 40 INJECTION, POWDER, FOR SOLUTION INTRAMUSCULAR; INTRAVENOUS at 14:34

## 2019-04-08 RX ADMIN — VEDOLIZUMAB 300 MG: 300 INJECTION, POWDER, LYOPHILIZED, FOR SOLUTION INTRAVENOUS at 15:13

## 2019-04-08 RX ADMIN — SODIUM CHLORIDE 20 ML/HR: 0.9 INJECTION, SOLUTION INTRAVENOUS at 14:39

## 2019-04-08 NOTE — PROGRESS NOTES
Pt without complaint, denies any new or recent infections, Entyvio administered as ordered and tolerated well without adverse reaction  AVS provided with next appt scheduled for 4 weeks

## 2019-04-08 NOTE — PLAN OF CARE
Problem: Potential for Falls  Goal: Patient will remain free of falls  Description  INTERVENTIONS:  - Assess patient frequently for physical needs  -  Identify cognitive and physical deficits and behaviors that affect risk of falls    -  [place] fall precautions as indicated by assessment   - Educate patient/family on patient safety including physical limitations  - Instruct patient to call for assistance with activity based on assessment  - Modify environment to reduce risk of injury  - Consider OT/PT consult to assist with strengthening/mobility  Outcome: Progressing

## 2019-04-11 ENCOUNTER — TELEPHONE (OUTPATIENT)
Dept: GASTROENTEROLOGY | Facility: CLINIC | Age: 49
End: 2019-04-11

## 2019-04-22 ENCOUNTER — OFFICE VISIT (OUTPATIENT)
Dept: GASTROENTEROLOGY | Facility: MEDICAL CENTER | Age: 49
End: 2019-04-22
Payer: COMMERCIAL

## 2019-04-22 VITALS
TEMPERATURE: 97.4 F | DIASTOLIC BLOOD PRESSURE: 72 MMHG | HEIGHT: 70 IN | SYSTOLIC BLOOD PRESSURE: 124 MMHG | WEIGHT: 258 LBS | BODY MASS INDEX: 36.94 KG/M2 | HEART RATE: 78 BPM

## 2019-04-22 DIAGNOSIS — R10.11 RUQ ABDOMINAL PAIN: ICD-10-CM

## 2019-04-22 DIAGNOSIS — R14.0 BLOATING: ICD-10-CM

## 2019-04-22 DIAGNOSIS — K51.311 ULCERATIVE RECTOSIGMOIDITIS WITH RECTAL BLEEDING (HCC): Primary | ICD-10-CM

## 2019-04-22 DIAGNOSIS — R10.9 ABDOMINAL CRAMPS: ICD-10-CM

## 2019-04-22 PROCEDURE — 99244 OFF/OP CNSLTJ NEW/EST MOD 40: CPT | Performed by: INTERNAL MEDICINE

## 2019-04-23 ENCOUNTER — TELEPHONE (OUTPATIENT)
Dept: GASTROENTEROLOGY | Facility: AMBULARY SURGERY CENTER | Age: 49
End: 2019-04-23

## 2019-04-23 ENCOUNTER — HOSPITAL ENCOUNTER (OUTPATIENT)
Dept: NUCLEAR MEDICINE | Facility: HOSPITAL | Age: 49
Discharge: HOME | End: 2019-04-23
Attending: INTERNAL MEDICINE
Payer: COMMERCIAL

## 2019-04-23 ENCOUNTER — DOCUMENTATION (OUTPATIENT)
Dept: GASTROENTEROLOGY | Facility: MEDICAL CENTER | Age: 49
End: 2019-04-23

## 2019-04-23 DIAGNOSIS — R10.11 RUQ PAIN: ICD-10-CM

## 2019-04-23 PROCEDURE — A9537 TC99M MEBROFENIN: HCPCS

## 2019-04-23 PROCEDURE — 78227 HEPATOBIL SYST IMAGE W/DRUG: CPT

## 2019-04-23 RX ADMIN — SINCALIDE 2.3 MCG: 5 INJECTION, POWDER, LYOPHILIZED, FOR SOLUTION INTRAVENOUS at 13:33

## 2019-04-24 DIAGNOSIS — K51.30 ULCERATIVE RECTOSIGMOIDITIS WITHOUT COMPLICATION (HCC): ICD-10-CM

## 2019-04-24 RX ORDER — SODIUM CHLORIDE 9 MG/ML
20 INJECTION, SOLUTION INTRAVENOUS ONCE
Status: CANCELLED | OUTPATIENT
Start: 2019-06-03

## 2019-05-03 ENCOUNTER — TELEPHONE (OUTPATIENT)
Dept: GASTROENTEROLOGY | Facility: CLINIC | Age: 49
End: 2019-05-03

## 2019-05-03 RX ORDER — METHYLPREDNISOLONE SODIUM SUCCINATE 40 MG/ML
40 INJECTION, POWDER, LYOPHILIZED, FOR SOLUTION INTRAMUSCULAR; INTRAVENOUS ONCE
Status: COMPLETED | OUTPATIENT
Start: 2019-05-06 | End: 2019-05-06

## 2019-05-03 RX ORDER — SODIUM CHLORIDE 9 MG/ML
20 INJECTION, SOLUTION INTRAVENOUS CONTINUOUS
Status: DISCONTINUED | OUTPATIENT
Start: 2019-05-06 | End: 2019-05-09 | Stop reason: HOSPADM

## 2019-05-03 RX ORDER — ACETAMINOPHEN 325 MG/1
975 TABLET ORAL ONCE
Status: COMPLETED | OUTPATIENT
Start: 2019-05-06 | End: 2019-05-06

## 2019-05-03 RX ORDER — DIPHENHYDRAMINE HCL 25 MG
25 TABLET ORAL ONCE
Status: DISCONTINUED | OUTPATIENT
Start: 2019-05-06 | End: 2019-05-09 | Stop reason: HOSPADM

## 2019-05-03 RX ORDER — ACETAMINOPHEN 325 MG/1
650 TABLET ORAL ONCE
Status: DISCONTINUED | OUTPATIENT
Start: 2019-05-06 | End: 2019-05-03 | Stop reason: SDUPTHER

## 2019-05-06 ENCOUNTER — HOSPITAL ENCOUNTER (OUTPATIENT)
Dept: INFUSION CENTER | Facility: CLINIC | Age: 49
Discharge: HOME | End: 2019-05-06
Payer: COMMERCIAL

## 2019-05-06 ENCOUNTER — DOCUMENTATION (OUTPATIENT)
Dept: GASTROENTEROLOGY | Facility: MEDICAL CENTER | Age: 49
End: 2019-05-06

## 2019-05-06 PROCEDURE — 96375 TX/PRO/DX INJ NEW DRUG ADDON: CPT

## 2019-05-06 PROCEDURE — 96413 CHEMO IV INFUSION 1 HR: CPT

## 2019-05-06 RX ADMIN — ACETAMINOPHEN 975 MG: 325 TABLET, FILM COATED ORAL at 14:23

## 2019-05-06 RX ADMIN — METHYLPREDNISOLONE SODIUM SUCCINATE 40 MG: 40 INJECTION, POWDER, FOR SOLUTION INTRAMUSCULAR; INTRAVENOUS at 14:27

## 2019-05-06 RX ADMIN — SODIUM CHLORIDE 20 ML/HR: 0.9 INJECTION, SOLUTION INTRAVENOUS at 14:24

## 2019-05-06 RX ADMIN — VEDOLIZUMAB 300 MG: 300 INJECTION, POWDER, LYOPHILIZED, FOR SOLUTION INTRAVENOUS at 14:40

## 2019-05-06 NOTE — PLAN OF CARE
Problem: INFECTION - ADULT  Goal: Absence or prevention of progression during hospitalization  Description  INTERVENTIONS:  - Assess and monitor for signs and symptoms of infection  - Monitor lab/diagnostic results  - Monitor all insertion sites, i e  indwelling lines, tubes, and drains  - Monitor endotracheal (as able) and nasal secretions for changes in amount and color  - [place] appropriate cooling/warming therapies per order  - Administer medications as ordered  - Instruct and encourage patient and family to use good hand hygiene technique  - Identify and instruct in appropriate isolation precautions for identified infection/condition  Outcome: Progressing

## 2019-05-10 ENCOUNTER — TELEPHONE (OUTPATIENT)
Dept: GASTROENTEROLOGY | Facility: AMBULARY SURGERY CENTER | Age: 49
End: 2019-05-10

## 2019-06-03 ENCOUNTER — HOSPITAL ENCOUNTER (OUTPATIENT)
Dept: INFUSION CENTER | Facility: CLINIC | Age: 49
End: 2019-06-03

## 2019-06-10 ENCOUNTER — TELEPHONE (OUTPATIENT)
Dept: GASTROENTEROLOGY | Facility: MEDICAL CENTER | Age: 49
End: 2019-06-10

## 2019-07-26 ENCOUNTER — ANESTHESIA EVENT (OUTPATIENT)
Dept: GASTROENTEROLOGY | Facility: MEDICAL CENTER | Age: 49
End: 2019-07-26

## 2019-07-29 ENCOUNTER — HOSPITAL ENCOUNTER (OUTPATIENT)
Dept: GASTROENTEROLOGY | Facility: MEDICAL CENTER | Age: 49
Setting detail: OUTPATIENT SURGERY
Discharge: HOME | End: 2019-07-29
Attending: INTERNAL MEDICINE | Admitting: INTERNAL MEDICINE
Payer: COMMERCIAL

## 2019-07-29 ENCOUNTER — ANESTHESIA (OUTPATIENT)
Dept: GASTROENTEROLOGY | Facility: MEDICAL CENTER | Age: 49
End: 2019-07-29

## 2019-07-29 VITALS
OXYGEN SATURATION: 97 % | HEART RATE: 78 BPM | WEIGHT: 250 LBS | SYSTOLIC BLOOD PRESSURE: 121 MMHG | TEMPERATURE: 98 F | RESPIRATION RATE: 16 BRPM | BODY MASS INDEX: 35 KG/M2 | DIASTOLIC BLOOD PRESSURE: 72 MMHG | HEIGHT: 71 IN

## 2019-07-29 DIAGNOSIS — K51.311 ULCERATIVE RECTOSIGMOIDITIS WITH RECTAL BLEEDING (HCC): Primary | ICD-10-CM

## 2019-07-29 DIAGNOSIS — R10.11 RIGHT UPPER QUADRANT PAIN: ICD-10-CM

## 2019-07-29 PROCEDURE — 43239 EGD BIOPSY SINGLE/MULTIPLE: CPT | Performed by: INTERNAL MEDICINE

## 2019-07-29 PROCEDURE — 88305 TISSUE EXAM BY PATHOLOGIST: CPT | Performed by: PATHOLOGY

## 2019-07-29 RX ORDER — PROPOFOL 10 MG/ML
INJECTION, EMULSION INTRAVENOUS AS NEEDED
Status: DISCONTINUED | OUTPATIENT
Start: 2019-07-29 | End: 2019-07-29 | Stop reason: SURG

## 2019-07-29 RX ORDER — SODIUM CHLORIDE 9 MG/ML
125 INJECTION, SOLUTION INTRAVENOUS CONTINUOUS
Status: DISCONTINUED | OUTPATIENT
Start: 2019-07-29 | End: 2019-08-02 | Stop reason: HOSPADM

## 2019-07-29 RX ADMIN — PROPOFOL 20 MG: 10 INJECTION, EMULSION INTRAVENOUS at 11:26

## 2019-07-29 RX ADMIN — SODIUM CHLORIDE 125 ML/HR: 0.9 INJECTION, SOLUTION INTRAVENOUS at 10:50

## 2019-07-29 RX ADMIN — PROPOFOL 140 MG: 10 INJECTION, EMULSION INTRAVENOUS at 11:21

## 2019-07-29 RX ADMIN — PROPOFOL 20 MG: 10 INJECTION, EMULSION INTRAVENOUS at 11:23

## 2019-07-29 RX ADMIN — PROPOFOL 20 MG: 10 INJECTION, EMULSION INTRAVENOUS at 11:27

## 2019-07-29 RX ADMIN — PROPOFOL 20 MG: 10 INJECTION, EMULSION INTRAVENOUS at 11:24

## 2019-07-29 NOTE — H&P
History and Physical - [facility]  Julia Larsen 51 y o  male MRN: [id number]                  HPI: Julia Larsen is a 51y o  year old male who presents for ulcerative colitis, Chauhan's, abdominal pain  REVIEW OF SYSTEMS: Per the HPI, and otherwise unremarkable  Historical Information   Past Medical History:   Diagnosis Date    Anxiety     Ulcerative colitis ([address] )     Vertigo      Past Surgical History:   Procedure Laterality Date    COLONOSCOPY N/A 4/4/2016    Procedure: COLONOSCOPY;  Surgeon: Hamilton Caballero MD;  Location: [facility]; Service:     EGD AND COLONOSCOPY N/A 9/11/2017    Procedure: EGD AND COLONOSCOPY;  Surgeon: Hamilton Caballero MD;  Location:  GI LAB; Service: Gastroenterology    HERNIA REPAIR      as infant    NASAL SEPTUM SURGERY      [place] COLONOSCOPY FLX DX W/COLLJ SPEC WHEN PFRMD N/A 3/29/2019    Procedure: COLONOSCOPY;  Surgeon: Hamilton Caballero MD;  Location: [facility]; Service: Gastroenterology     Social History   Social History     Substance and Sexual Activity   Alcohol Use Yes    Comment: occationally     Social History     Substance and Sexual Activity   Drug Use No     Social History     Tobacco Use   Smoking Status Former Smoker    Last attempt to quit: 3/29/2004    Years since quitting: 15 3   Smokeless Tobacco Never Used     History reviewed  No pertinent family history  Meds/Allergies       (Not in a hospital admission)    No Known Allergies    Objective     Blood pressure 125/67, pulse 74, temperature 98 °F (36 7 °C), temperature source Temporal, resp  rate 16, height 5' 11" (1 803 m), weight 113 kg (250 lb), SpO2 97 %  PHYSICAL EXAM    Gen: NAD  CV: RRR  CHEST: Clear  ABD: soft, NT/ND  EXT: no edema      ASSESSMENT/PLAN:  This is a 51y o  year old male here for upper endoscopy, and he is stable and optimized for his procedure

## 2019-07-29 NOTE — ANESTHESIA PREPROCEDURE EVALUATION
Review of Systems/Medical History  Patient summary reviewed  Chart reviewed      Cardiovascular  Negative cardio ROS    Pulmonary  Negative pulmonary ROS        GI/Hepatic  Negative GI/hepatic ROS   No bowel prep  Comment: Ulcerative Colitis     Negative  ROS        Endo/Other    Obesity    GYN       Hematology  Negative hematology ROS      Musculoskeletal  Negative musculoskeletal ROS        Neurology  Negative neurology ROS      Psychology   Anxiety, Depression , being treated for depression,              Physical Exam    Airway    Mallampati score: III  TM Distance: >3 FB  Neck ROM: full     Dental   No notable dental hx     Cardiovascular  Comment: Negative ROS, Rhythm: regular, Rate: normal, Cardiovascular exam normal    Pulmonary  Pulmonary exam normal Breath sounds clear to auscultation,     Other Findings        Anesthesia Plan  ASA Score- 2     Anesthesia Type- IV sedation with anesthesia with ASA Monitors  Additional Monitors:   Airway Plan:         Plan Factors- Patient instructed to abstain from smoking on day of procedure  Patient did not smoke on day of surgery  Induction- intravenous  Postoperative Plan-     Informed Consent- Anesthetic plan and risks discussed with patient

## 2019-07-29 NOTE — DISCHARGE INSTRUCTIONS
Upper Endoscopy   WHAT YOU NEED TO KNOW:   An upper endoscopy is also called an upper gastrointestinal (GI) endoscopy, or an esophagogastroduodenoscopy (EGD)  You may feel bloated, gassy, or have some abdominal discomfort after your procedure  Your throat may be sore for 24 to 36 hours  You may burp or pass gas from air that is still inside your body  DISCHARGE INSTRUCTIONS:   Call 911 if:   · You have sudden chest pain or trouble breathing  Seek care immediately if:   · You feel dizzy or faint  · You have trouble swallowing  · You have severe throat pain  · Your bowel movements are very dark or black  · Your abdomen is hard and firm and you have severe pain  · You vomit blood  Contact your healthcare provider if:   · You feel full or bloated and cannot burp or pass gas  · You have not had a bowel movement for 3 days after your procedure  · You have neck pain  · You have a fever or chills  · You have nausea or are vomiting  · You have a rash or hives  · You have questions or concerns about your endoscopy  Relieve a sore throat:  Suck on throat lozenges or crushed ice  Gargle with a small amount of warm salt water  Mix 1 teaspoon of salt and 1 cup of warm water to make salt water  Relieve gas and discomfort from bloating:  Lie on your right side with a heating pad on your abdomen  Take short walks to help pass gas  Eat small meals until bloating is relieved  Rest after your procedure:  Do not drive or make important decisions until the day after your procedure  Return to your normal activity as directed  You can usually return to work the day after your procedure  Follow up with your healthcare provider as directed:  Write down your questions so you remember to ask them during your visits  © 2017 [employer]  Information is for End User's use only and may not be sold, redistributed or otherwise used for commercial purposes   All illustrations and images included in [employer] are the copyrighted property of [employer] , "[employer]"  or [employer]  The above information is an  only  It is not intended as medical advice for individual conditions or treatments  Talk to your doctor, nurse or pharmacist before following any medical regimen to see if it is safe and effective for you

## 2019-08-01 ENCOUNTER — HOSPITAL ENCOUNTER (OUTPATIENT)
Dept: MRI IMAGING | Facility: HOSPITAL | Age: 49
Discharge: HOME | End: 2019-08-01
Attending: INTERNAL MEDICINE
Payer: COMMERCIAL

## 2019-08-01 DIAGNOSIS — R10.11 RUQ ABDOMINAL PAIN: ICD-10-CM

## 2019-08-01 PROCEDURE — A9585 GADOBUTROL INJECTION: HCPCS | Performed by: INTERNAL MEDICINE

## 2019-08-01 PROCEDURE — 72197 MRI PELVIS W/O & W/DYE: CPT

## 2019-08-01 PROCEDURE — 74183 MRI ABD W/O CNTR FLWD CNTR: CPT

## 2019-08-01 RX ADMIN — GADOBUTROL 11 ML: 604.72 INJECTION INTRAVENOUS at 09:41

## 2019-08-01 RX ADMIN — GLUCAGON HYDROCHLORIDE 1 MG: KIT at 09:25

## 2019-09-06 DIAGNOSIS — K51.20 ULCERATIVE PROCTITIS WITHOUT COMPLICATION (HCC): Primary | ICD-10-CM

## 2019-09-06 RX ORDER — TOFACITINIB 10 MG/1
TABLET, FILM COATED ORAL
Qty: 60 TABLET | Refills: 3 | Status: SHIPPED | OUTPATIENT
Start: 2019-09-06 | End: 2020-03-26 | Stop reason: SDUPTHER

## 2020-03-26 ENCOUNTER — TELEPHONE (OUTPATIENT)
Dept: GASTROENTEROLOGY | Facility: AMBULARY SURGERY CENTER | Age: 50
End: 2020-03-26

## 2020-03-26 DIAGNOSIS — K51.20 ULCERATIVE PROCTITIS WITHOUT COMPLICATION (HCC): ICD-10-CM

## 2020-03-26 NOTE — TELEPHONE ENCOUNTER
[address] called stating they received the script for pt's [place] and it should be going to a [facility]   Phone # [phone]

## 2020-03-26 NOTE — TELEPHONE ENCOUNTER
GI Physician: Dr Su Cifuentes for Medication: Wander unas    Dose: 10 mg BID     Quantity: 90 day supply with refills    Pharmacy and Location: [facility] pharm

## 2020-06-29 ENCOUNTER — TELEPHONE (OUTPATIENT)
Dept: GASTROENTEROLOGY | Facility: AMBULARY SURGERY CENTER | Age: 50
End: 2020-06-29

## 2020-06-29 ENCOUNTER — OFFICE VISIT (OUTPATIENT)
Dept: GASTROENTEROLOGY | Facility: MEDICAL CENTER | Age: 50
End: 2020-06-29
Payer: COMMERCIAL

## 2020-06-29 VITALS
HEART RATE: 85 BPM | DIASTOLIC BLOOD PRESSURE: 73 MMHG | HEIGHT: 71 IN | TEMPERATURE: 97.2 F | BODY MASS INDEX: 37.52 KG/M2 | WEIGHT: 268 LBS | SYSTOLIC BLOOD PRESSURE: 123 MMHG

## 2020-06-29 DIAGNOSIS — K51.311 ULCERATIVE RECTOSIGMOIDITIS WITH RECTAL BLEEDING (HCC): Primary | ICD-10-CM

## 2020-06-29 PROCEDURE — 99214 OFFICE O/P EST MOD 30 MIN: CPT | Performed by: PHYSICIAN ASSISTANT

## 2020-06-29 RX ORDER — TOFACITINIB 10 MG/1
TABLET, FILM COATED ORAL
COMMUNITY
Start: 2019-05-09 | End: 2020-08-14

## 2020-06-29 RX ORDER — MESALAMINE 4 G/60ML
4 ENEMA RECTAL
Qty: 1200 ML | Refills: 2 | Status: SHIPPED | OUTPATIENT
Start: 2020-06-29 | End: 2020-09-22 | Stop reason: ALTCHOICE

## 2020-06-29 NOTE — H&P (VIEW-ONLY)
Assessment/Plan:     Diagnoses and all orders for this visit:    Ulcerative rectosigmoiditis with rectal bleeding ([facility] [address] )  Patient has a history of ulcerative colitis and has failed Humira as well as Entyvio  He is currently on [place]  He states he feels much better compared to previous however he continues to have significant bowel movements, occasional bleeding and left lower quadrant abdominal pain  He has not had a colonoscopy in more than a year would recommend repeating 1 at this time  He also is overdue for blood work  We will attempt to get him the Cortifoam as he did have good relief with this medication   -     CBC and differential; Future  -     Comprehensive metabolic panel; Future  -     C-reactive protein; Future  -     Na Sulfate-K Sulfate-Mg Sulf (Suprep Bowel Prep Kit) 17 5-3 13-1 6 GM/177ML SOLN; day before, use 6 oz bottle  4 hours before procedure, take 2nd dose  -     Discontinue: Hydrocortisone Acetate (Cortifoam) 10 % FOAM; Insert into the rectal daily  -     Colonoscopy; Future  -     PAT Covid Screening; Future  -     Hydrocortisone Acetate (Cortifoam) 10 % FOAM; Insert into the rectal daily    Will see him back after to discuss all results  Subjective:      Patient ID: Ainsley Montelongo is a 53 y o  male  HPI     This is a follow-up for ulcerative colitis  Patient was previously on Humira as well as Entyvio and then switched to [place] approximately 1 year ago  We have not seen him since  He states he has been overall feeling well  He does complain of some mild left lower quadrant abdominal discomfort  He describes this as dull and fairly constant  It is not exacerbated by food or bowel movements  He states he is still going approximately 10 times per day with needing to get up at night as well  He has occasional rectal bleeding  He states this is much better than it was in the past when he was going approximately 20 times per day    He does use Lomotil on an as-needed basis and does get good relief but does not want overlie on it daily  He denies any upper symptoms  His last colonoscopy showed severe ulceration, erythema, friability and pseudopolyps in the rectum and distal sigmoid up to 35 cm from the anal verge  He was previously using Cortifoam but states this 1 on back order and he has been unable to get it     He has not had any recent lab work    Patient Active Problem List   Diagnosis    Ulcerative colitis    Acute colitis    Anxiety about health    Transient insomnia    RUQ pain    Ulcerative [address]os[address]oiditis without complication [address] [address] )    RUQ abdominal pain     No Known Allergies  Current Outpatient Medications on File Prior to Visit   Medication Sig    sertraline (ZOLOFT) 50 mg tablet Take 100 mg by mouth daily      Tofacitinib Citrate (Xeljanz) 10 MG TABS     dicyclomine (BENTYL) 20 mg tablet Take 1 tablet (20 mg total) by mouth every 6 (six) hours (Patient not taking: Reported on 6/29/2020)    diphenoxylate-atropine (LOMOTIL) 2 5-0 025 mg per tablet Take 1 tablet by mouth 4 (four) times a day as needed for diarrhea (Patient not taking: Reported on 6/29/2020)    hydrocortisone (CORTENEMA) 100 mg/60 mL enema Insert 60 mL (100 mg total) into the rectum daily at bedtime (Patient not taking: Reported on 5/6/2019)    mesalamine (CANASA) 1,000 mg suppository Insert 1 suppository (1,000 mg total) into the rectum daily at bedtime (Patient not taking: Reported on 12/3/2018 )    mesalamine (LIALDA) 1 2 g EC tablet Take 4 pills by mouth daily (Patient not taking: Reported on 6/29/2020)    predniSONE 10 mg tablet Take 4 pills x 1 week, then 3 pills x 1 week, & continue until gone (Patient not taking: Reported on 5/6/2019)    [DISCONTINUED] hydrocortisone (CORTIFOAM) 10 % rectal foam Insert 1 applicator into the rectum 2 (two) times a day Dispense 60 applicators (Patient not taking: Reported on 6/29/2020)     No current facility-administered medications on file prior to visit  No family history on file  Past Medical History:   Diagnosis Date    Anxiety     Ulcerative colitis ([address] )     Vertigo      Social History     Socioeconomic History    Marital status: /Civil Union     Spouse name: None    Number of children: None    Years of education: None    Highest education level: None   Occupational History     Employer: [employer]   Social Needs    Financial resource strain: None    Food insecurity:     Worry: None     Inability: None    Transportation needs:     Medical: None     Non-medical: None   Tobacco Use    Smoking status: Former Smoker     Last attempt to quit: 3/29/2004     Years since quittin 2    Smokeless tobacco: Never Used   Substance and Sexual Activity    Alcohol use: Yes     Comment: occationally    Drug use: No    Sexual activity: None   Lifestyle    Physical activity:     Days per week: None     Minutes per session: None    Stress: None   Relationships    Social connections:     Talks on phone: None     Gets together: None     Attends Evangelical service: None     Active member of club or organization: None     Attends meetings of clubs or organizations: None     Relationship status: None    Intimate partner violence:     Fear of current or ex partner: None     Emotionally abused: None     Physically abused: None     Forced sexual activity: None   Other Topics Concern    None   Social History Narrative    None     Past Surgical History:   Procedure Laterality Date    COLONOSCOPY N/A 2016    Procedure: COLONOSCOPY;  Surgeon: Xander Herrera MD;  Location: [facility]; Service:     EGD AND COLONOSCOPY N/A 2017    Procedure: EGD AND COLONOSCOPY;  Surgeon: Xander Herrera MD;  [facility]: [facility];   Service: Gastroenterology    HERNIA REPAIR      as infant    NASAL SEPTUM SURGERY      [place] COLONOSCOPY FLX DX W/COLLJ [facility] WHEN PFRMD N/A 3/29/2019    Procedure: COLONOSCOPY;  Surgeon: Xander Herrera MD; Location: [facility]; Service: Gastroenterology         Review of Systems   All other systems reviewed and are negative  Objective:      /73   Pulse 85   Temp (!) 97 2 °F (36 2 °C) (Tympanic)   Ht 5' 11" (1 803 m)   Wt 122 kg (268 lb)   BMI 37 38 kg/m²          Physical Exam   Constitutional: He is oriented to person, place, and time  He appears well-developed and well-nourished  Over weight   HENT:   Head: Normocephalic  Eyes: Conjunctivae and EOM are normal    Neck: Normal range of motion  Cardiovascular: Normal rate and regular rhythm  Pulmonary/Chest: Effort normal and breath sounds normal    Abdominal: Soft  Bowel sounds are normal  He exhibits no distension  There is no tenderness  Musculoskeletal: Normal range of motion  Neurological: He is alert and oriented to person, place, and time  Skin: Skin is warm and dry  Psychiatric: He has a normal mood and affect   His behavior is normal

## 2020-06-30 ENCOUNTER — ANESTHESIA EVENT (OUTPATIENT)
Dept: GASTROENTEROLOGY | Facility: MEDICAL CENTER | Age: 50
End: 2020-06-30

## 2020-07-21 ENCOUNTER — APPOINTMENT (OUTPATIENT)
Dept: LAB | Facility: MEDICAL CENTER | Age: 50
End: 2020-07-21
Payer: COMMERCIAL

## 2020-07-21 DIAGNOSIS — K51.311 ULCERATIVE RECTOSIGMOIDITIS WITH RECTAL BLEEDING (HCC): ICD-10-CM

## 2020-07-21 LAB
ALBUMIN SERPL BCP-MCNC: 3.4 G/DL (ref 3.5–5)
ALP SERPL-CCNC: 73 U/L (ref 46–116)
ALT SERPL W P-5'-P-CCNC: 23 U/L (ref 12–78)
ANION GAP SERPL CALCULATED.3IONS-SCNC: 4 MMOL/L (ref 4–13)
AST SERPL W P-5'-P-CCNC: 12 U/L (ref 5–45)
BASOPHILS # BLD AUTO: 0.05 THOUSANDS/ΜL (ref 0–0.1)
BASOPHILS NFR BLD AUTO: 1 % (ref 0–1)
BILIRUB SERPL-MCNC: 0.29 MG/DL (ref 0.2–1)
BUN SERPL-MCNC: 12 MG/DL (ref 5–25)
CALCIUM SERPL-MCNC: 8.9 MG/DL (ref 8.3–10.1)
CHLORIDE SERPL-SCNC: 106 MMOL/L (ref 100–108)
CO2 SERPL-SCNC: 29 MMOL/L (ref 21–32)
CREAT SERPL-MCNC: 0.9 MG/DL (ref 0.6–1.3)
CRP SERPL QL: 17.1 MG/L
EOSINOPHIL # BLD AUTO: 0.26 THOUSAND/ΜL (ref 0–0.61)
EOSINOPHIL NFR BLD AUTO: 3 % (ref 0–6)
ERYTHROCYTE [DISTWIDTH] IN BLOOD BY AUTOMATED COUNT: 15.9 % (ref 11.6–15.1)
GFR SERPL CREATININE-BSD FRML MDRD: 100 ML/MIN/1.73SQ M
GLUCOSE SERPL-MCNC: 110 MG/DL (ref 65–140)
HCT VFR BLD AUTO: 34.9 % (ref 36.5–49.3)
HGB BLD-MCNC: 10.1 G/DL (ref 12–17)
IMM GRANULOCYTES # BLD AUTO: 0.03 THOUSAND/UL (ref 0–0.2)
IMM GRANULOCYTES NFR BLD AUTO: 0 % (ref 0–2)
LYMPHOCYTES # BLD AUTO: 1.28 THOUSANDS/ΜL (ref 0.6–4.47)
LYMPHOCYTES NFR BLD AUTO: 16 % (ref 14–44)
MCH RBC QN AUTO: 23 PG (ref 26.8–34.3)
MCHC RBC AUTO-ENTMCNC: 28.9 G/DL (ref 31.4–37.4)
MCV RBC AUTO: 79 FL (ref 82–98)
MONOCYTES # BLD AUTO: 0.78 THOUSAND/ΜL (ref 0.17–1.22)
MONOCYTES NFR BLD AUTO: 10 % (ref 4–12)
NEUTROPHILS # BLD AUTO: 5.67 THOUSANDS/ΜL (ref 1.85–7.62)
NEUTS SEG NFR BLD AUTO: 70 % (ref 43–75)
NRBC BLD AUTO-RTO: 0 /100 WBCS
PLATELET # BLD AUTO: 381 THOUSANDS/UL (ref 149–390)
PMV BLD AUTO: 11.6 FL (ref 8.9–12.7)
POTASSIUM SERPL-SCNC: 4.5 MMOL/L (ref 3.5–5.3)
PROT SERPL-MCNC: 7.7 G/DL (ref 6.4–8.2)
RBC # BLD AUTO: 4.4 MILLION/UL (ref 3.88–5.62)
SODIUM SERPL-SCNC: 139 MMOL/L (ref 136–145)
WBC # BLD AUTO: 8.07 THOUSAND/UL (ref 4.31–10.16)

## 2020-07-21 PROCEDURE — U0003 INFECTIOUS AGENT DETECTION BY NUCLEIC ACID (DNA OR RNA); SEVERE ACUTE RESPIRATORY SYNDROME CORONAVIRUS 2 (SARS-COV-2) (CORONAVIRUS DISEASE [COVID-19]), AMPLIFIED PROBE TECHNIQUE, MAKING USE OF HIGH THROUGHPUT TECHNOLOGIES AS DESCRIBED BY CMS-2020-01-R: HCPCS

## 2020-07-21 PROCEDURE — 36415 COLL VENOUS BLD VENIPUNCTURE: CPT

## 2020-07-21 PROCEDURE — 86140 C-REACTIVE PROTEIN: CPT

## 2020-07-21 PROCEDURE — 80053 COMPREHEN METABOLIC PANEL: CPT

## 2020-07-21 PROCEDURE — 85025 COMPLETE CBC W/AUTO DIFF WBC: CPT

## 2020-07-22 ENCOUNTER — TELEPHONE (OUTPATIENT)
Dept: GASTROENTEROLOGY | Facility: AMBULARY SURGERY CENTER | Age: 50
End: 2020-07-22

## 2020-07-22 LAB
INPATIENT: NORMAL
SARS-COV-2 RNA SPEC QL NAA+PROBE: NOT DETECTED

## 2020-07-22 NOTE — TELEPHONE ENCOUNTER
Called as spoke with pharmacist  He states they do not carry the hydrocortisone acetate (cortifoam)  He states they do have hydrocortisone cream (anusol) and hydrocortisone suppositories  Would you like to order one of these instead?

## 2020-07-22 NOTE — TELEPHONE ENCOUNTER
Patients GI provider:  Dr Logan [facility]    Number to return call: [phone] , [phone] fax    Reason for call: Pharmacy called to get an alternate for cortifoam because it is out of stock    Scheduled procedure/appointment date if applicable: Apt/procedure  8-10-20

## 2020-07-23 DIAGNOSIS — K64.9 HEMORRHOIDS, UNSPECIFIED HEMORRHOID TYPE: Primary | ICD-10-CM

## 2020-07-23 RX ORDER — HYDROCORTISONE 25 MG/G
CREAM TOPICAL 2 TIMES DAILY
Qty: 1 TUBE | Refills: 2 | Status: SHIPPED | OUTPATIENT
Start: 2020-07-23 | End: 2020-09-22 | Stop reason: ALTCHOICE

## 2020-07-28 ENCOUNTER — ANESTHESIA (OUTPATIENT)
Dept: GASTROENTEROLOGY | Facility: MEDICAL CENTER | Age: 50
End: 2020-07-28

## 2020-07-28 ENCOUNTER — HOSPITAL ENCOUNTER (OUTPATIENT)
Dept: GASTROENTEROLOGY | Facility: MEDICAL CENTER | Age: 50
Setting detail: OUTPATIENT SURGERY
Discharge: HOME | End: 2020-07-28
Payer: COMMERCIAL

## 2020-07-28 VITALS
WEIGHT: 265 LBS | HEIGHT: 70 IN | OXYGEN SATURATION: 98 % | BODY MASS INDEX: 37.94 KG/M2 | HEART RATE: 85 BPM | TEMPERATURE: 97.8 F | RESPIRATION RATE: 16 BRPM | SYSTOLIC BLOOD PRESSURE: 118 MMHG | DIASTOLIC BLOOD PRESSURE: 74 MMHG

## 2020-07-28 DIAGNOSIS — K51.311 ULCERATIVE RECTOSIGMOIDITIS WITH RECTAL BLEEDING (HCC): ICD-10-CM

## 2020-07-28 PROCEDURE — 88305 TISSUE EXAM BY PATHOLOGIST: CPT | Performed by: PATHOLOGY

## 2020-07-28 PROCEDURE — 45380 COLONOSCOPY AND BIOPSY: CPT | Performed by: INTERNAL MEDICINE

## 2020-07-28 RX ORDER — SODIUM CHLORIDE 9 MG/ML
125 INJECTION, SOLUTION INTRAVENOUS CONTINUOUS
Status: DISCONTINUED | OUTPATIENT
Start: 2020-07-28 | End: 2020-08-01 | Stop reason: HOSPADM

## 2020-07-28 RX ORDER — PROPOFOL 10 MG/ML
INJECTION, EMULSION INTRAVENOUS AS NEEDED
Status: DISCONTINUED | OUTPATIENT
Start: 2020-07-28 | End: 2020-07-28 | Stop reason: SURG

## 2020-07-28 RX ADMIN — PROPOFOL 100 MG: 10 INJECTION, EMULSION INTRAVENOUS at 12:32

## 2020-07-28 RX ADMIN — PROPOFOL 50 MG: 10 INJECTION, EMULSION INTRAVENOUS at 12:44

## 2020-07-28 RX ADMIN — PROPOFOL 50 MG: 10 INJECTION, EMULSION INTRAVENOUS at 12:38

## 2020-07-28 RX ADMIN — PROPOFOL 100 MG: 10 INJECTION, EMULSION INTRAVENOUS at 12:34

## 2020-07-28 RX ADMIN — PROPOFOL 50 MG: 10 INJECTION, EMULSION INTRAVENOUS at 12:36

## 2020-07-28 RX ADMIN — PROPOFOL 50 MG: 10 INJECTION, EMULSION INTRAVENOUS at 12:43

## 2020-07-28 RX ADMIN — SODIUM CHLORIDE 125 ML/HR: 0.9 INJECTION, SOLUTION INTRAVENOUS at 12:21

## 2020-07-28 RX ADMIN — PROPOFOL 50 MG: 10 INJECTION, EMULSION INTRAVENOUS at 12:48

## 2020-07-28 NOTE — ANESTHESIA PREPROCEDURE EVALUATION
Review of Systems/Medical History  Patient summary reviewed  Chart reviewed      Cardiovascular  Negative cardio ROS    Pulmonary  Negative pulmonary ROS Smoker ex-smoker  ,        GI/Hepatic      Comment: UC     Negative  ROS        Endo/Other    Obesity    GYN       Hematology  Negative hematology ROS      Musculoskeletal  Negative musculoskeletal ROS        Neurology  Negative neurology ROS      Psychology   Anxiety, Depression ,              Physical Exam    Airway    Mallampati score: I  TM Distance: >3 FB  Neck ROM: full     Dental   No notable dental hx     Cardiovascular  Comment: Negative ROS, Rhythm: regular, Rate: normal,     Pulmonary  Breath sounds clear to auscultation,     Other Findings        Anesthesia Plan  ASA Score- 2     Anesthesia Type- IV sedation with anesthesia with ASA Monitors  Additional Monitors:   Airway Plan:         Plan Factors-  Patient did not smoke on day of surgery  Induction- intravenous  Postoperative Plan-     Informed Consent- Anesthetic plan and risks discussed with patient

## 2020-07-28 NOTE — ANESTHESIA POSTPROCEDURE EVALUATION
Post-Op Assessment Note    CV Status:  Stable    Pain management: adequate     Mental Status:  Alert   PONV Controlled:  None   Airway Patency:  Patent   Post Op Vitals Reviewed: Yes      Staff: Anesthesiologist           /74 (07/28/20 1306)    Temp      Pulse 85 (07/28/20 1306)   Resp 16 (07/28/20 1306)    SpO2 98 % (07/28/20 1306)

## 2020-07-28 NOTE — DISCHARGE INSTRUCTIONS
Colonoscopy   WHAT YOU NEED TO KNOW:   A colonoscopy is a procedure to examine the inside of your colon (intestine) with a scope  Polyps or tissue growths may have been removed during your colonoscopy  It is normal to feel bloated and to have some abdominal discomfort  You should be passing gas  If you have hemorrhoids or you had polyps removed, you may have a small amount of bleeding  DISCHARGE INSTRUCTIONS:   Seek care immediately if:   · You have a large amount of bright red blood in your bowel movements  · Your abdomen is hard and firm and you have severe pain  · You have sudden trouble breathing  Contact your healthcare provider if:   · You develop a rash or hives  · You have a fever within 24 hours of your procedure  · You have not had a bowel movement for 3 days after your procedure  · You have questions or concerns about your condition or care  Activity:   · Do not lift, strain, or run  for 3 days after your procedure  · Rest after your procedure  You have been given medicine to relax you  Do not  drive or make important decisions until the day after your procedure  Return to your normal activity as directed  · Relieve gas and discomfort from bloating  by lying on your right side with a heating pad on your abdomen  You may need to take short walks to help the gas move out  Eat small meals until bloating is relieved  If you had polyps removed: For 7 days after your procedure:  · Do not  take aspirin  · Do not  go on long car rides  Help prevent constipation:   · Eat a variety of healthy foods  Healthy foods include fruit, vegetables, whole-grain breads, low-fat dairy products, beans, lean meat, and fish  Ask if you need to be on a special diet  Your healthcare provider may recommend that you eat high-fiber foods such as cooked beans  Fiber helps you have regular bowel movements  · Drink liquids as directed    Adults should drink between 9 and 13 eight-ounce cups of liquid every day  Ask what amount is best for you  For most people, good liquids to drink are water, juice, and milk  · Exercise as directed  Talk to your healthcare provider about the best exercise plan for you  Exercise can help prevent constipation, decrease your blood pressure and improve your health  Follow up with your healthcare provider as directed:  Write down your questions so you remember to ask them during your visits  © 2017 [employer] Information is for End User's use only and may not be sold, redistributed or otherwise used for commercial purposes  All illustrations and images included in CareNotes® are the copyrighted property of [employer]  or [employer]  The above information is an  only  It is not intended as medical advice for individual conditions or treatments  Talk to your doctor, nurse or pharmacist before following any medical regimen to see if it is safe and effective for you

## 2020-08-10 ENCOUNTER — TELEMEDICINE (OUTPATIENT)
Dept: GASTROENTEROLOGY | Facility: MEDICAL CENTER | Age: 50
End: 2020-08-10
Payer: COMMERCIAL

## 2020-08-10 DIAGNOSIS — R15.2 FECAL URGENCY: ICD-10-CM

## 2020-08-10 DIAGNOSIS — R15.9 INCONTINENCE OF FECES, UNSPECIFIED FECAL INCONTINENCE TYPE: ICD-10-CM

## 2020-08-10 DIAGNOSIS — D84.9 IMMUNOCOMPROMISED STATE (HCC): ICD-10-CM

## 2020-08-10 DIAGNOSIS — K51.30 ULCERATIVE RECTOSIGMOIDITIS WITHOUT COMPLICATION (HCC): Primary | ICD-10-CM

## 2020-08-10 PROCEDURE — 99215 OFFICE O/P EST HI 40 MIN: CPT | Performed by: INTERNAL MEDICINE

## 2020-08-10 RX ORDER — SODIUM CHLORIDE 9 MG/ML
20 INJECTION, SOLUTION INTRAVENOUS ONCE
Status: CANCELLED | OUTPATIENT
Start: 2020-08-24

## 2020-08-10 NOTE — PROGRESS NOTES
Virtual Regular Visit      Assessment/Plan:  52 yo man with ulcerative proctosigmoiditis with urgency and diarrhea who presents for follow-up  We reviewed his colonoscopy and biopsy results  He is not adequately controlled with Annemarie Fer, and previously failed Humira and Entyvio  He has significant urgency and incontience  Still having 10+ BMs per day  No significant pain or bloody stools  Disease is moderately to severely active  Available labs and imaging reviewed  Problem List Items Addressed This Visit        Digestive    Ulcerative rectosigmoiditis without complication ([facility] [address] ) - Primary      Other Visit Diagnoses     Immunocompromised state ([facility] [address] )        Fecal urgency        Incontinence of feces, unspecified fecal incontinence type            We discussed treatment options including increase the Xeljanz back to full dose, Switching to Stelara or switching to Remicade  The risks, benefits and alternatives were discussed and we opted to switch to Stelara to get his disease under better control  Obtain blood work at next visit, including CBC, CMP, CRP  Flex sig in 6-9 months potentially  Next colonoscopy in 1-2 years  Routine derm, ophtho, dental exams  Avoid live vaccines  Yearly flu shot  Pneumonia vaccines  Avoid NSAIDs, tobacco       Reason for visit is   Chief Complaint   Patient presents with    Virtual Regular Visit        Encounter provider Lito Busby MD    Provider located at [address] [address]      Recent Visits  No visits were found meeting these conditions  Showing recent visits within past 7 days and meeting all other requirements     Today's Visits  Date Type Provider Dept   08/10/20 Telemedicine MD Marco A Conrad [place]   Showing today's visits and meeting all other requirements     Future Appointments  No visits were found meeting these conditions     Showing future appointments within next 150 days and meeting all other requirements        The patient was identified by name and date of birth  Re Castle was informed that this is a telemedicine visit and that the visit is being conducted through [address] and patient was informed that this is not a secure, HIPAA-complaint platform  He agrees to proceed     My office door was closed  No one else was in the room  He acknowledged consent and understanding of privacy and security of the video platform  The patient has agreed to participate and understands they can discontinue the visit at any time  Patient is aware this is a billable service  Subjective  Re Castle is a 53 y o  male with UC here to discuss UC management  He feels well since the colonoscopy  He feels better on the Jevon mawr, compared with any thing he was on previously  He was previously on it twice a day but now once a day  On average he has 10-12 BMs per day, some formed and usually soft, occasionally liquid  No blood or blacks tools  +Urgency, and it can be severe  + Incontinence (2 weeks ago)  + Nocturnal BMs  No abdominal pain  No rectal pain  No rashes, mouth sores, joint pains, eye pain  He feels that his vision is getting worse, but his glasses have not changed int he past 18 months  No heartburn, dysphagia, odynophagia, nausea, vomiting  + Restless legs at night  No weight loss, fevers, chills  Past Medical History:   Diagnosis Date    Anxiety     Colon polyp     Ulcerative colitis ([facility] [address] )     Vertigo        Past Surgical History:   Procedure Laterality Date    COLONOSCOPY N/A 4/4/2016    Procedure: COLONOSCOPY;  Surgeon: Lynn Gonzalez MD;  [facility]: [facility]; Service:     EGD AND COLONOSCOPY N/A 9/11/2017    Procedure: EGD AND COLONOSCOPY;  Surgeon: Lynn Gonzalez MD;  [facility]: [facility];   Service: Gastroenterology    HERNIA REPAIR      as infant    NASAL SEPTUM SURGERY      IN COLONOSCOPY FLX DX W/COLLJ [facility] WHEN PFRMD N/A 3/29/2019    Procedure: COLONOSCOPY;  Surgeon: Gerson Rodriges MD;  Location: [facility]; Service: Gastroenterology       Current Outpatient Medications   Medication Sig Dispense Refill    dicyclomine (BENTYL) 20 mg tablet Take 1 tablet (20 mg total) by mouth every 6 (six) hours (Patient not taking: Reported on 6/29/2020) 120 tablet 4    diphenoxylate-atropine (LOMOTIL) 2 5-0 025 mg per tablet Take 1 tablet by mouth 4 (four) times a day as needed for diarrhea (Patient not taking: Reported on 6/29/2020) 120 tablet 5    hydrocortisone (ANUSOL-HC) 2 5 % rectal cream Apply topically 2 (two) times a day 1 Tube 2    hydrocortisone (CORTENEMA) 100 mg/60 mL enema Insert 60 mL (100 mg total) into the rectum daily at bedtime (Patient not taking: Reported on 5/6/2019) 30 enema 5    Hydrocortisone Acetate (Cortifoam) 10 % FOAM Insert into the rectal daily 4 Can 6    mesalamine (CANASA) 1,000 mg suppository Insert 1 suppository (1,000 mg total) into the rectum daily at bedtime (Patient not taking: Reported on 12/3/2018 ) 30 suppository 5    mesalamine (LIALDA) 1 2 g EC tablet Take 4 pills by mouth daily (Patient not taking: Reported on 6/29/2020) 120 tablet 0    mesalamine (ROWASA) 4 g Insert 60 mL (4 g total) into the rectum daily at bedtime 1200 mL 2    sertraline (ZOLOFT) 50 mg tablet Take 100 mg by mouth daily        Tofacitinib Citrate (Xeljanz) 10 MG TABS        No current facility-administered medications for this visit  No Known Allergies    REVIEW OF SYSTEMS:    CONSTITUTIONAL: Denies any fever, chills, rigors, and weight loss  HEENT: No earache or tinnitus  Denies hearing loss or visual disturbances  CARDIOVASCULAR: No chest pain or palpitations  RESPIRATORY: Denies any cough, hemoptysis, shortness of breath or dyspnea on exertion  GASTROINTESTINAL: As noted in the History of Present Illness  GENITOURINARY: No problems with urination   Denies any hematuria or dysuria  NEUROLOGIC: No dizziness or vertigo, denies headaches  MUSCULOSKELETAL: Denies any muscle or joint pain  SKIN: Denies skin rashes or itching  ENDOCRINE: Denies excessive thirst  Denies intolerance to heat or cold  PSYCHOSOCIAL: Denies depression or anxiety  Denies any recent memory loss  PHYSICAL EXAMINATION:  Appearance and vitals taken from home devices    General Appearance:   Alert, cooperative, no distress   HEENT:  Normocephalic, atraumatic, anicteric  Neck supple, symmetrical, trachea midline  Lungs:   Equal chest rise and unlabored breathing, normal effort, no coughing  Cardiovascular:   No visualized JVD  Abdomen:   No abdominal distension  Skin:   No jaundice, rashes, or lesions  Musculoskeletal:   Normal range of motion visualized  Psych:  Normal affect and normal insight  Neuro:  Alert and appropriate  I spent 20 minutes directly with the patient during this visit      Tigre Cassandra acknowledges that he has consented to an online visit or consultation  He understands that the online visit is based solely on information provided by him, and that, in the absence of a face-to-face physical evaluation by the physician, the diagnosis he receives is both limited and provisional in terms of accuracy and completeness  This is not intended to replace a full medical face-to-face evaluation by the physician  Emerita Hall understands and accepts these terms

## 2020-08-14 DIAGNOSIS — K51.919 ULCERATIVE COLITIS WITH COMPLICATION, UNSPECIFIED LOCATION (HCC): Primary | ICD-10-CM

## 2020-08-14 RX ORDER — TOFACITINIB 10 MG/1
TABLET, FILM COATED ORAL
Qty: 60 TABLET | Refills: 2 | Status: SHIPPED | OUTPATIENT
Start: 2020-08-14 | End: 2020-09-22 | Stop reason: ALTCHOICE

## 2020-09-01 ENCOUNTER — TELEPHONE (OUTPATIENT)
Dept: GASTROENTEROLOGY | Facility: AMBULARY SURGERY CENTER | Age: 50
End: 2020-09-01

## 2020-09-01 NOTE — TELEPHONE ENCOUNTER
Patients GI provider:  Dr Melendez Headings    Number to return call: (  [phone]     Reason for call: Jeff from [facility] called to speak with a medical assistant regarding stelara    Scheduled procedure/appointment date if applicable: Apt/procedure  NA

## 2020-09-02 NOTE — TELEPHONE ENCOUNTER
Patients GI provider:  Dr Francisco Galicia    Number to return call: [phone]    Reason for call: Ramu Matt from [facility] called requesting to speak with Lito Rucker to follow up on the fax that was received    Scheduled procedure/appointment date if applicable: Apt/procedure n/a

## 2020-09-16 NOTE — TELEPHONE ENCOUNTER
Received letter of approval for Stelara 130mg  Auth# [id number] Valid: 9/11/2020 to 10/11/2020  Patient is scheduled for 09/22/2020 at 9:30am at [address] [facility]

## 2020-09-22 ENCOUNTER — HOSPITAL ENCOUNTER (OUTPATIENT)
Dept: INFUSION CENTER | Facility: CLINIC | Age: 50
Discharge: HOME | End: 2020-09-22
Payer: COMMERCIAL

## 2020-09-22 ENCOUNTER — TELEPHONE (OUTPATIENT)
Dept: GASTROENTEROLOGY | Facility: AMBULARY SURGERY CENTER | Age: 50
End: 2020-09-22

## 2020-09-22 VITALS
RESPIRATION RATE: 18 BRPM | DIASTOLIC BLOOD PRESSURE: 91 MMHG | TEMPERATURE: 98.2 F | HEART RATE: 92 BPM | SYSTOLIC BLOOD PRESSURE: 146 MMHG

## 2020-09-22 DIAGNOSIS — K51.30 ULCERATIVE RECTOSIGMOIDITIS WITHOUT COMPLICATION (HCC): Primary | ICD-10-CM

## 2020-09-22 PROCEDURE — 96365 THER/PROPH/DIAG IV INF INIT: CPT

## 2020-09-22 RX ORDER — SODIUM CHLORIDE 9 MG/ML
20 INJECTION, SOLUTION INTRAVENOUS ONCE
Status: COMPLETED | OUTPATIENT
Start: 2020-09-22 | End: 2020-09-22

## 2020-09-22 RX ORDER — SODIUM CHLORIDE 9 MG/ML
20 INJECTION, SOLUTION INTRAVENOUS ONCE
Status: CANCELLED | OUTPATIENT
Start: 2020-09-22

## 2020-09-22 RX ADMIN — SODIUM CHLORIDE 20 ML/HR: 0.9 INJECTION, SOLUTION INTRAVENOUS at 11:00

## 2020-09-22 RX ADMIN — USTEKINUMAB 520 MG: 130 SOLUTION INTRAVENOUS at 11:09

## 2020-09-22 NOTE — TELEPHONE ENCOUNTER
Spoke to Mape  Verbal ok for patient to have Stelara infusion via IV today per Dr Ponce Post  Phoned Marley Logan @ [address] [address] to make aware

## 2020-09-22 NOTE — PROGRESS NOTES
Patient tolerated Stelara infusion  Denies any discomforts  Patient will continue with Stelara injections at home  Patient in contact with physician office in regards to injections   Declined AVS

## 2020-09-22 NOTE — TELEPHONE ENCOUNTER
Message forwarded to Practice Administrator Diana Suero  Is there a provider available to answer question due to patient at infusion center presently?      Thank you

## 2020-09-22 NOTE — TELEPHONE ENCOUNTER
Patient at infusion center to receive first dose of Stelara via IV  Patient reports taking Vivien Guardado yesterday  Infusion Center would like to know if ok to receive infusion today or reschedule? Future dosing of Stelara will be by injection  Nicole Ponce Post & forwarded to provider for recommendation

## 2020-09-22 NOTE — PROGRESS NOTES
Patient arrived on unit for first time Stelara  Patient stated he took his Paddy Band yesterday  Called and spoke with Salvatore Gardner, nurse in Dr Eusebio Ang office who made Dr Francisco Barton aware of above  As per physician, Patient cleared for Stelara today   Patient aware and understands

## 2020-10-02 ENCOUNTER — DOCUMENTATION (OUTPATIENT)
Dept: GASTROENTEROLOGY | Facility: MEDICAL CENTER | Age: 50
End: 2020-10-02

## 2020-11-16 ENCOUNTER — TELEPHONE (OUTPATIENT)
Dept: GASTROENTEROLOGY | Facility: CLINIC | Age: 50
End: 2020-11-16

## 2021-02-15 ENCOUNTER — TELEPHONE (OUTPATIENT)
Dept: GASTROENTEROLOGY | Facility: CLINIC | Age: 51
End: 2021-02-15

## 2021-02-15 NOTE — TELEPHONE ENCOUNTER
Patients GI provider:  Dr Mukund Garcia    Number to return call: [phone]    Reason for call: Pt's wife Jed Howe called requesting to speak to Dr Mukund Garcia or GABO Diego regarding pt taking Stelara and having the covid vaccine    Scheduled procedure/appointment date if applicable: NA

## 2021-02-15 NOTE — TELEPHONE ENCOUNTER
Hi,    Can we set the patient up for a follow-up visit with me, perhaps in IBD clinic? Thank you        (discussed COVID questions with the patient's wife)

## 2021-03-10 DIAGNOSIS — Z23 ENCOUNTER FOR IMMUNIZATION: ICD-10-CM

## 2021-03-23 ENCOUNTER — TELEPHONE (OUTPATIENT)
Dept: GASTROENTEROLOGY | Facility: AMBULARY SURGERY CENTER | Age: 51
End: 2021-03-23

## 2021-03-23 NOTE — TELEPHONE ENCOUNTER
Patients GI provider:  Dr Jeremie Belcher     Number to return call: [phone]     Reason for call: Humaira Cheema from [facility] called requesting to speak with someone regarding the entyvio date given       Reference #[id number]    Scheduled procedure/appointment date if applicable: Apt/procedure

## 2021-03-26 NOTE — TELEPHONE ENCOUNTER
Ketty Shukla from [facility] at [phone]  She wanted to know how much Entyvio was infused on dates 2/11/2019, 3/11/2019, and 4/8/2019  I let her know that it was 300mg on each infusion  This is all she needed

## 2021-05-27 NOTE — TELEPHONE ENCOUNTER
Shilpa Pedraza from [employer] called the office asking for information for 2/11/2019, 3/11/2019, and 4/8/2019  She wanted to know if the patient received the medication through [facility] or was it [facility]  I explained that according to our telephone note, the patient got medication from [facility]

## 2021-09-17 ENCOUNTER — TELEPHONE (OUTPATIENT)
Dept: GASTROENTEROLOGY | Facility: MEDICAL CENTER | Age: 51
End: 2021-09-17

## 2021-09-17 ENCOUNTER — OFFICE VISIT (OUTPATIENT)
Dept: GASTROENTEROLOGY | Facility: MEDICAL CENTER | Age: 51
End: 2021-09-17
Payer: COMMERCIAL

## 2021-09-17 ENCOUNTER — TELEPHONE (OUTPATIENT)
Dept: GASTROENTEROLOGY | Facility: CLINIC | Age: 51
End: 2021-09-17

## 2021-09-17 VITALS
DIASTOLIC BLOOD PRESSURE: 74 MMHG | HEART RATE: 86 BPM | SYSTOLIC BLOOD PRESSURE: 134 MMHG | WEIGHT: 277 LBS | BODY MASS INDEX: 38.78 KG/M2 | TEMPERATURE: 98 F | HEIGHT: 71 IN

## 2021-09-17 DIAGNOSIS — R79.89 LOW VITAMIN D LEVEL: ICD-10-CM

## 2021-09-17 DIAGNOSIS — K62.5 RECTAL BLEEDING: ICD-10-CM

## 2021-09-17 DIAGNOSIS — K51.919 ULCERATIVE COLITIS WITH COMPLICATION, UNSPECIFIED LOCATION (HCC): Primary | ICD-10-CM

## 2021-09-17 DIAGNOSIS — R19.7 DIARRHEA, UNSPECIFIED TYPE: ICD-10-CM

## 2021-09-17 DIAGNOSIS — D84.9 IMMUNOCOMPROMISED STATE (HCC): ICD-10-CM

## 2021-09-17 DIAGNOSIS — E53.8 LOW VITAMIN B12 LEVEL: ICD-10-CM

## 2021-09-17 DIAGNOSIS — R15.2 FECAL URGENCY: ICD-10-CM

## 2021-09-17 PROCEDURE — 99214 OFFICE O/P EST MOD 30 MIN: CPT | Performed by: INTERNAL MEDICINE

## 2021-09-17 RX ORDER — USTEKINUMAB 90 MG/ML
90 INJECTION, SOLUTION SUBCUTANEOUS
Qty: 1 ML | Refills: 6 | Status: SHIPPED | OUTPATIENT
Start: 2021-09-17 | End: 2021-12-13 | Stop reason: SDUPTHER

## 2021-09-17 RX ORDER — USTEKINUMAB 90 MG/ML
INJECTION, SOLUTION SUBCUTANEOUS
COMMUNITY
Start: 2021-08-07 | End: 2021-09-17 | Stop reason: SDUPTHER

## 2021-09-17 RX ORDER — USTEKINUMAB 90 MG/ML
90 INJECTION, SOLUTION SUBCUTANEOUS
Qty: 1 ML | Refills: 6 | Status: SHIPPED | OUTPATIENT
Start: 2021-09-17 | End: 2021-09-17

## 2021-09-17 NOTE — PATIENT INSTRUCTIONS
The patient is scheduled at [facility] for a Colonoscopy with Dr David Zhao on 11/11/2021  miralax/dulcolax prep instructions have been gone over in the office, with the patient, by the MA  The patient is aware that they will receive a call with the arrival time the day prior to procedure and that they will need a  the day of the procedure   I have asked the patient to call with any questions that they might have prior to procedure

## 2021-09-17 NOTE — PROGRESS NOTES
[facility] - Outpatient Follow-up Note  Janie May 49 y o  male MRN: [id number]  Encounter: [id number]          ASSESSMENT AND PLAN:    Janie May is a 49 y o  male who presents with complaint of UC previously on Humira, Charmayne Gera and now [place]  He currently feels improved on the Stelara but still with increased stools and some urgency, although he has improvement  No significant abdominal pain  Prior colonoscopy with inflammation in the rectum and sigmoid  CMP with low albumin at 3 4 but otherwise normal  CBC with low Hgb at 10 1, but normal WBC and Plts  CRP elevated at 17 1  His disease remains moderately active  1  Ulcerative colitis with complication, unspecified location ([facility] [address] )    2  Immunocompromised state ([facility] [address] )    3  Fecal urgency    4  Diarrhea, unspecified type    5  Rectal bleeding    6  Low vitamin D level    7  Low vitamin B12 level        Orders Placed This Encounter   Procedures    Calprotectin,Fecal    CBC and differential    C-reactive protein    Comprehensive metabolic panel    Vitamin D 25 hydroxy    Vitamin B12    Quantiferon TB Gold Plus    MISCELLANEOUS LAB TEST    Ambulatory referral to Dermatology    Colonoscopy     Continue Stelara every 8 weeks for now  Check Stelara level and shortening frequency to every 4 weeks if the level is low  Repeat blood work and stool tests as noted  Colonoscopy to reassess disease and dysplasia surveillance  Derm referral  Flu shot when available  COVID vaccine up to date  Ongoing IBD HCM  ______________________________________________________________________    SUBJECTIVE:    Janie May is a 49 y o  male who presents with complaint of UC    Overall he feels well on Stelara  However, after week 6 the effect wanes  He has received 6 injections  He had a good Summer and is now getting back into routine     BMs doing well, with 10 BMs per day, usually more in the morning and it trails off in the afternoon  Less urgency after he eats  No abdominal pain, rectal pain  Occasional spotty blood in the bowl 30% of the time  No black stools  + Nocturnal BMs (early morning)  No accidents  No heartburn, dysphagia, odynophagia, nausea, vomtiing  No fevers, chills  No weight loss  He gained weight but is now trying to lose weight    + poison icy rash but no other rashes  No joint pains, mouth sores  He received 3 doses of COVID vaccine    REVIEW OF SYSTEMS IS OTHERWISE NEGATIVE  10 point ROS reviewed and negative, except as above      Historical Information   Past Medical History:   Diagnosis Date    Anxiety     Colon polyp     Ulcerative colitis ([facility] [address] )     Vertigo      Past Surgical History:   Procedure Laterality Date    COLONOSCOPY N/A 2016    Procedure: COLONOSCOPY;  Surgeon: Stella Contreras MD;  Location: [facility]; Service:     EGD AND COLONOSCOPY N/A 2017    Procedure: EGD AND COLONOSCOPY;  Surgeon: Stella Contreras MD;  Location:  GI LAB; Service: Gastroenterology    HERNIA REPAIR      as infant    NASAL SEPTUM SURGERY      MN COLONOSCOPY FLX DX W/COLLJ SPEC WHEN PFRMD N/A 3/29/2019    Procedure: COLONOSCOPY;  Surgeon: Stella Contreras MD;  Location: [facility] GI [facility]; Service: Gastroenterology     Social History   Social History     Substance and Sexual Activity   Alcohol Use Yes    Comment: occationally     Social History     Substance and Sexual Activity   Drug Use No     Social History     Tobacco Use   Smoking Status Former Smoker    Quit date: 3/29/2004    Years since quittin 4   Smokeless Tobacco Never Used     History reviewed  No pertinent family history  Meds/Allergies       Current Outpatient Medications:     sertraline (ZOLOFT) 50 mg tablet    Stelara 90 MG/ML subcutaneous injection    No Known Allergies        Objective     Blood pressure 134/74, pulse 86, temperature 98 °F (36 7 °C), height 5' 10 5" (1 791 m), weight 126 kg (277 lb)   Body mass index is 39 18 kg/m²  PHYSICAL EXAMINATION:    General Appearance:   Alert, cooperative, no distress   HEENT:  Normocephalic, atraumatic, anicteric  Neck supple, symmetrical, trachea midline  Lungs:   Equal chest rise and unlabored breathing, normal effort, no coughing  Cardiovascular:   No visualized JVD  Abdomen:   No abdominal distension  Skin:   No jaundice, or lesions  + poison icy rash on legs   Musculoskeletal:   Normal range of motion visualized  Psych:  Normal affect and normal insight  Neuro:  Alert and appropriate  Lab Results:   No visits with results within 1 Day(s) from this visit     Latest known visit with results is:   Hospital Outpatient Visit on 07/28/2020   Component Date Value    Case Report 07/28/2020                      Value:Surgical Pathology Report                         Case: [id number]                                   Authorizing Provider:  Tato Jarquin MD    Collected:           07/28/2020 1239              Ordering Location:     [address] [place] [address]        Received:            07/28/2020 [facility]                                                     Pathologist:           Radha Serra MD                                                   Specimens:   A) - Colon, Right colon bx-cold                                                                     B) - Large Intestine, Transverse Colon, proximal transverse colon bx-cold                           C) - Large Intestine, Transverse Colon, Distal  transverse colon bx-cold                            D) - Large Intestine, Left/Descending Colon, Descending colon bx-cold                               E) - Colon, colon bx at 27 cm-cold                                                                                            F) - Colon, colon bx at 20 cm-cold                                                                  G) - Rectum, rectum bx-cold  Final Diagnosis 07/28/2020                      Value: This result contains rich text formatting which cannot be displayed here   Additional Information 07/28/2020                      Value: This result contains rich text formatting which cannot be displayed here  Tiara Pop Gross Description 07/28/2020                      Value: This result contains rich text formatting which cannot be displayed here   Clinical Information 07/28/2020                      Value:Hx of Ulcerative Colitis       Lab Results   Component Value Date    WBC 8 07 07/21/2020    HGB 10 1 (L) 07/21/2020    HCT 34 9 (L) 07/21/2020    MCV 79 (L) 07/21/2020     07/21/2020       Lab Results   Component Value Date    SODIUM 139 07/21/2020    K 4 5 07/21/2020     07/21/2020    CO2 29 07/21/2020    AGAP 4 07/21/2020    BUN 12 07/21/2020    CREATININE 0 90 07/21/2020    GLUC 110 07/21/2020    CALCIUM 8 9 07/21/2020    AST 12 07/21/2020    ALT 23 07/21/2020    ALKPHOS 73 07/21/2020    TP 7 7 07/21/2020    TBILI 0 29 07/21/2020    EGFR 100 07/21/2020       Lab Results   Component Value Date    CRP 17 1 (H) 07/21/2020       No results found for: [id number], TSH    No results found for: IRON, TIBC, FERRITIN    Radiology Results:   No results found

## 2021-09-24 NOTE — TELEPHONE ENCOUNTER
I called [facility]     Spoke with Isaiah Kim stating scriot need dose clarification    I was Transferred to Pharmacist Kiko Duffy, they haven't filled for patient since 2017      I called pt to verify [address]  he uses [facility]  [phone]      I spoke with Ashli who transferred me to pharmacist Jordan Mireles    We went over verbal prescription for Stelara maintenance 90mg q56 days    Ran through , covered

## 2021-11-01 ENCOUNTER — TELEPHONE (OUTPATIENT)
Dept: GASTROENTEROLOGY | Facility: CLINIC | Age: 51
End: 2021-11-01

## 2021-11-18 ENCOUNTER — TELEPHONE (OUTPATIENT)
Dept: GASTROENTEROLOGY | Facility: CLINIC | Age: 51
End: 2021-11-18

## 2021-11-24 ENCOUNTER — APPOINTMENT (OUTPATIENT)
Dept: LAB | Facility: MEDICAL CENTER | Age: 51
End: 2021-11-24
Attending: INTERNAL MEDICINE
Payer: COMMERCIAL

## 2021-11-24 ENCOUNTER — TELEPHONE (OUTPATIENT)
Dept: GASTROENTEROLOGY | Facility: CLINIC | Age: 51
End: 2021-11-24

## 2021-11-24 DIAGNOSIS — E53.8 LOW VITAMIN B12 LEVEL: ICD-10-CM

## 2021-11-24 DIAGNOSIS — R79.89 LOW VITAMIN D LEVEL: ICD-10-CM

## 2021-11-24 DIAGNOSIS — D84.9 IMMUNOCOMPROMISED STATE (HCC): ICD-10-CM

## 2021-11-24 DIAGNOSIS — K51.919 ULCERATIVE COLITIS WITH COMPLICATION, UNSPECIFIED LOCATION (HCC): ICD-10-CM

## 2021-11-24 DIAGNOSIS — K62.5 RECTAL BLEEDING: ICD-10-CM

## 2021-11-24 DIAGNOSIS — R19.7 DIARRHEA, UNSPECIFIED TYPE: ICD-10-CM

## 2021-11-24 LAB
25(OH)D3 SERPL-MCNC: 14.9 NG/ML (ref 30–100)
ALBUMIN SERPL BCP-MCNC: 3.6 G/DL (ref 3.5–5)
ALP SERPL-CCNC: 71 U/L (ref 46–116)
ALT SERPL W P-5'-P-CCNC: 21 U/L (ref 12–78)
ANION GAP SERPL CALCULATED.3IONS-SCNC: 7 MMOL/L (ref 4–13)
AST SERPL W P-5'-P-CCNC: 14 U/L (ref 5–45)
BASOPHILS # BLD AUTO: 0.03 THOUSANDS/ΜL (ref 0–0.1)
BASOPHILS NFR BLD AUTO: 0 % (ref 0–1)
BILIRUB SERPL-MCNC: 0.27 MG/DL (ref 0.2–1)
BUN SERPL-MCNC: 14 MG/DL (ref 5–25)
CALCIUM SERPL-MCNC: 9.8 MG/DL (ref 8.3–10.1)
CHLORIDE SERPL-SCNC: 105 MMOL/L (ref 100–108)
CO2 SERPL-SCNC: 25 MMOL/L (ref 21–32)
CREAT SERPL-MCNC: 0.9 MG/DL (ref 0.6–1.3)
CRP SERPL QL: 12.6 MG/L
EOSINOPHIL # BLD AUTO: 0.19 THOUSAND/ΜL (ref 0–0.61)
EOSINOPHIL NFR BLD AUTO: 3 % (ref 0–6)
ERYTHROCYTE [DISTWIDTH] IN BLOOD BY AUTOMATED COUNT: 14.7 % (ref 11.6–15.1)
GFR SERPL CREATININE-BSD FRML MDRD: 99 ML/MIN/1.73SQ M
GLUCOSE SERPL-MCNC: 103 MG/DL (ref 65–140)
HCT VFR BLD AUTO: 38.1 % (ref 36.5–49.3)
HGB BLD-MCNC: 11.4 G/DL (ref 12–17)
IMM GRANULOCYTES # BLD AUTO: 0.03 THOUSAND/UL (ref 0–0.2)
IMM GRANULOCYTES NFR BLD AUTO: 0 % (ref 0–2)
LYMPHOCYTES # BLD AUTO: 1.46 THOUSANDS/ΜL (ref 0.6–4.47)
LYMPHOCYTES NFR BLD AUTO: 21 % (ref 14–44)
MCH RBC QN AUTO: 24.8 PG (ref 26.8–34.3)
MCHC RBC AUTO-ENTMCNC: 29.9 G/DL (ref 31.4–37.4)
MCV RBC AUTO: 83 FL (ref 82–98)
MONOCYTES # BLD AUTO: 0.58 THOUSAND/ΜL (ref 0.17–1.22)
MONOCYTES NFR BLD AUTO: 8 % (ref 4–12)
NEUTROPHILS # BLD AUTO: 4.76 THOUSANDS/ΜL (ref 1.85–7.62)
NEUTS SEG NFR BLD AUTO: 68 % (ref 43–75)
NRBC BLD AUTO-RTO: 0 /100 WBCS
PLATELET # BLD AUTO: 338 THOUSANDS/UL (ref 149–390)
PMV BLD AUTO: 11.7 FL (ref 8.9–12.7)
POTASSIUM SERPL-SCNC: 4.4 MMOL/L (ref 3.5–5.3)
PROT SERPL-MCNC: 7.7 G/DL (ref 6.4–8.2)
RBC # BLD AUTO: 4.59 MILLION/UL (ref 3.88–5.62)
SODIUM SERPL-SCNC: 137 MMOL/L (ref 136–145)
VIT B12 SERPL-MCNC: 491 PG/ML (ref 100–900)
WBC # BLD AUTO: 7.05 THOUSAND/UL (ref 4.31–10.16)

## 2021-11-24 PROCEDURE — 82306 VITAMIN D 25 HYDROXY: CPT

## 2021-11-24 PROCEDURE — 36415 COLL VENOUS BLD VENIPUNCTURE: CPT

## 2021-11-24 PROCEDURE — 85025 COMPLETE CBC W/AUTO DIFF WBC: CPT

## 2021-11-24 PROCEDURE — 82397 CHEMILUMINESCENT ASSAY: CPT

## 2021-11-24 PROCEDURE — 80053 COMPREHEN METABOLIC PANEL: CPT

## 2021-11-24 PROCEDURE — 86480 TB TEST CELL IMMUN MEASURE: CPT

## 2021-11-24 PROCEDURE — 82607 VITAMIN B-12: CPT

## 2021-11-24 PROCEDURE — 80299 QUANTITATIVE ASSAY DRUG: CPT

## 2021-11-24 PROCEDURE — 86140 C-REACTIVE PROTEIN: CPT

## 2021-11-28 LAB
GAMMA INTERFERON BACKGROUND BLD IA-ACNC: 0.03 IU/ML
M TB IFN-G BLD-IMP: NEGATIVE
M TB IFN-G CD4+ BCKGRND COR BLD-ACNC: -0.01 IU/ML
M TB IFN-G CD4+ BCKGRND COR BLD-ACNC: 0 IU/ML
MITOGEN IGNF BCKGRD COR BLD-ACNC: 9.24 IU/ML

## 2021-12-01 ENCOUNTER — TELEPHONE (OUTPATIENT)
Dept: GASTROENTEROLOGY | Facility: CLINIC | Age: 51
End: 2021-12-01

## 2021-12-02 ENCOUNTER — TELEPHONE (OUTPATIENT)
Dept: GASTROENTEROLOGY | Facility: CLINIC | Age: 51
End: 2021-12-02

## 2021-12-06 ENCOUNTER — TELEPHONE (OUTPATIENT)
Dept: GASTROENTEROLOGY | Facility: CLINIC | Age: 51
End: 2021-12-06

## 2021-12-06 ENCOUNTER — TELEPHONE (OUTPATIENT)
Dept: GASTROENTEROLOGY | Facility: MEDICAL CENTER | Age: 51
End: 2021-12-06

## 2021-12-06 DIAGNOSIS — K51.919 ULCERATIVE COLITIS WITH COMPLICATION, UNSPECIFIED LOCATION (HCC): ICD-10-CM

## 2021-12-08 ENCOUNTER — TELEPHONE (OUTPATIENT)
Dept: GASTROENTEROLOGY | Facility: MEDICAL CENTER | Age: 51
End: 2021-12-08

## 2021-12-08 ENCOUNTER — TELEPHONE (OUTPATIENT)
Dept: GASTROENTEROLOGY | Facility: CLINIC | Age: 51
End: 2021-12-08

## 2021-12-09 ENCOUNTER — TELEPHONE (OUTPATIENT)
Dept: GASTROENTEROLOGY | Facility: CLINIC | Age: 51
End: 2021-12-09

## 2021-12-13 RX ORDER — USTEKINUMAB 90 MG/ML
90 INJECTION, SOLUTION SUBCUTANEOUS
Qty: 1 ML | Refills: 6 | Status: SHIPPED | OUTPATIENT
Start: 2021-12-13 | End: 2022-03-24 | Stop reason: SDUPTHER

## 2021-12-29 ENCOUNTER — TELEPHONE (OUTPATIENT)
Dept: GASTROENTEROLOGY | Facility: MEDICAL CENTER | Age: 51
End: 2021-12-29

## 2021-12-30 ENCOUNTER — HOSPITAL ENCOUNTER (OUTPATIENT)
Dept: GASTROENTEROLOGY | Facility: MEDICAL CENTER | Age: 51
Setting detail: OUTPATIENT SURGERY
Discharge: HOME | End: 2021-12-30
Attending: INTERNAL MEDICINE | Admitting: INTERNAL MEDICINE
Payer: COMMERCIAL

## 2021-12-30 ENCOUNTER — ANESTHESIA EVENT (OUTPATIENT)
Dept: GASTROENTEROLOGY | Facility: MEDICAL CENTER | Age: 51
End: 2021-12-30

## 2021-12-30 ENCOUNTER — ANESTHESIA (OUTPATIENT)
Dept: GASTROENTEROLOGY | Facility: MEDICAL CENTER | Age: 51
End: 2021-12-30

## 2021-12-30 VITALS
RESPIRATION RATE: 20 BRPM | BODY MASS INDEX: 39.2 KG/M2 | TEMPERATURE: 97.8 F | DIASTOLIC BLOOD PRESSURE: 80 MMHG | HEIGHT: 71 IN | HEART RATE: 89 BPM | SYSTOLIC BLOOD PRESSURE: 127 MMHG | OXYGEN SATURATION: 95 % | WEIGHT: 280 LBS

## 2021-12-30 DIAGNOSIS — K51.919 ULCERATIVE COLITIS WITH COMPLICATION, UNSPECIFIED LOCATION (HCC): ICD-10-CM

## 2021-12-30 PROBLEM — E66.812 OBESITY, CLASS II, BMI 35-39.9: Status: ACTIVE | Noted: 2021-12-30

## 2021-12-30 PROBLEM — E66.9 OBESITY, CLASS II, BMI 35-39.9: Status: ACTIVE | Noted: 2021-12-30

## 2021-12-30 PROCEDURE — 88342 IMHCHEM/IMCYTCHM 1ST ANTB: CPT | Performed by: PATHOLOGY

## 2021-12-30 PROCEDURE — 88305 TISSUE EXAM BY PATHOLOGIST: CPT | Performed by: PATHOLOGY

## 2021-12-30 PROCEDURE — 45380 COLONOSCOPY AND BIOPSY: CPT | Performed by: INTERNAL MEDICINE

## 2021-12-30 RX ORDER — PROPOFOL 10 MG/ML
INJECTION, EMULSION INTRAVENOUS AS NEEDED
Status: DISCONTINUED | OUTPATIENT
Start: 2021-12-30 | End: 2021-12-30

## 2021-12-30 RX ORDER — LIDOCAINE HYDROCHLORIDE 20 MG/ML
INJECTION, SOLUTION EPIDURAL; INFILTRATION; INTRACAUDAL; PERINEURAL AS NEEDED
Status: DISCONTINUED | OUTPATIENT
Start: 2021-12-30 | End: 2021-12-30

## 2021-12-30 RX ORDER — SODIUM CHLORIDE 9 MG/ML
125 INJECTION, SOLUTION INTRAVENOUS CONTINUOUS
Status: DISCONTINUED | OUTPATIENT
Start: 2021-12-30 | End: 2022-01-03 | Stop reason: HOSPADM

## 2021-12-30 RX ORDER — PROPOFOL 10 MG/ML
INJECTION, EMULSION INTRAVENOUS CONTINUOUS PRN
Status: DISCONTINUED | OUTPATIENT
Start: 2021-12-30 | End: 2021-12-30

## 2021-12-30 RX ADMIN — PROPOFOL 150 MG: 10 INJECTION, EMULSION INTRAVENOUS at 11:24

## 2021-12-30 RX ADMIN — LIDOCAINE HYDROCHLORIDE 50 MG: 20 INJECTION, SOLUTION EPIDURAL; INFILTRATION; INTRACAUDAL; PERINEURAL at 11:24

## 2021-12-30 RX ADMIN — PROPOFOL 50 MG: 10 INJECTION, EMULSION INTRAVENOUS at 11:33

## 2021-12-30 RX ADMIN — SODIUM CHLORIDE: 0.9 INJECTION, SOLUTION INTRAVENOUS at 11:00

## 2021-12-30 RX ADMIN — PROPOFOL 160 MCG/KG/MIN: 10 INJECTION, EMULSION INTRAVENOUS at 11:32

## 2021-12-30 RX ADMIN — PROPOFOL 50 MG: 10 INJECTION, EMULSION INTRAVENOUS at 11:25

## 2021-12-30 RX ADMIN — PROPOFOL 50 MG: 10 INJECTION, EMULSION INTRAVENOUS at 11:30

## 2021-12-30 RX ADMIN — PROPOFOL 50 MG: 10 INJECTION, EMULSION INTRAVENOUS at 11:27

## 2021-12-30 RX ADMIN — PROPOFOL 50 MG: 10 INJECTION, EMULSION INTRAVENOUS at 11:26

## 2021-12-30 NOTE — H&P
[facility] - History & Physical  Elmarie Brittle 49 y o  male MRN: [id number]      HPI: Elmarie Brittle is a 49y o  year old male who presents for colonoscopy for ulcerative colitis  REVIEW OF SYSTEMS: Per the HPI, and otherwise unremarkable  Historical Information   Past Medical History:   Diagnosis Date    Anxiety     Colon polyp     Pneumonia     Ulcerative colitis ([address] )     Vertigo      Past Surgical History:   Procedure Laterality Date    COLONOSCOPY N/A 2016    Procedure: COLONOSCOPY;  Surgeon: Linda Srinivasan MD;  Location: [facility]; Service:     EGD AND COLONOSCOPY N/A 2017    Procedure: EGD AND COLONOSCOPY;  Surgeon: Linda Srinivasan MD;  Location:  GI LAB; Service: Gastroenterology    HERNIA REPAIR      as infant    NASAL SEPTUM SURGERY      MI COLONOSCOPY FLX DX W/COLLJ SPEC WHEN PFRMD N/A 3/29/2019    Procedure: COLONOSCOPY;  Surgeon: Linda Srinivasan MD;  Location: [facility]; Service: Gastroenterology     Social History   Social History     Substance and Sexual Activity   Alcohol Use Yes    Comment: occationally     Social History     Substance and Sexual Activity   Drug Use No     Social History     Tobacco Use   Smoking Status Former Smoker    Quit date: 3/29/2004    Years since quittin 7   Smokeless Tobacco Never Used     History reviewed  No pertinent family history  MEDICATIONS & ALLERGIES:    Current Outpatient Medications   Medication Instructions    sertraline (ZOLOFT) 100 mg, Oral, Daily    Stelara 90 mg, Subcutaneous, Every 28 days     No Known Allergies    PHYSICAL EXAM:    Objective   Blood pressure 145/89, pulse 86, temperature 97 8 °F (36 6 °C), temperature source Temporal, resp  rate 20, height 5' 10 5" (1 791 m), weight 127 kg (280 lb), SpO2 96 %  Body mass index is 39 61 kg/m²      Gen: NAD  CV: RRR  CHEST: Clear  ABD: Soft, NT/ND  EXT: No edema    ASSESSMENT AND PLAN:  This is a 49y o  year old male here for colonoscopy, and he is stable and optimized for his procedure  BRETT Chowdary  Gastroenterology Fellow  [facility]  Available on [email or web address]

## 2022-01-31 ENCOUNTER — TELEPHONE (OUTPATIENT)
Dept: GASTROENTEROLOGY | Facility: MEDICAL CENTER | Age: 52
End: 2022-01-31

## 2022-01-31 DIAGNOSIS — K51.919 ULCERATIVE COLITIS WITH COMPLICATION, UNSPECIFIED LOCATION (HCC): ICD-10-CM

## 2022-01-31 NOTE — TELEPHONE ENCOUNTER
----- Message from Sangeeta Venegas MA sent at 1/31/2022 10:01 AM EST -----  Regarding: FW: Biopsy results    ----- Message -----  From: Lesa Russell  Sent: 1/31/2022   9:45 AM EST  To: [facility]  Subject: Biopsy results                                   Good morning  I have received paperwork from [address] [place] [address] regarding an independent review  Looks like they are denying the stelara being every four weeks  Could your office assist with this? Could Shikha give me a call [phone]? TY and hope you are well       [facility]

## 2022-01-31 NOTE — TELEPHONE ENCOUNTER
Called Hannah back and confirmed that we also rec'd a letter from [facility]  I was unsure why this was started and so was Dona Ogden  She spoke with [facility] and confirmed the they didn't make this requested  I said that I would call Dwaine and see what I can find out  Spoke to Alex Nicolas at [facility] and I was told that they don't know why they got this request and cannot tell me when this request came through  They suggested to reach out to [facility]  I relayed this conversation to Dona Ogden and said that I will reach out to [facility]  (Patient approved for Stelara 90mg/mL SQ q 28 days until 3/11/22)

## 2022-01-31 NOTE — LETTER
February 1, 2022    Regarding:  Emogene Juniper  [address]  [address] [address]      To whom it may concern:      Mr  Jillian Christine is current under my care for moderate to severe ulcerative colitis  He has been tried on and failed multiple prior medications for his colitis, including Humira, Cosby Michael  He is currently on Stelara with some improvement both in terms of clinical symptoms and colonoscopy, but he continues to have moderate disease both clinically (with increased stools, diarrhea, fecal urgency, although he has improvement  His CRP is elevated  His recent Stelara level was low at 0 6 and I would like him to be on every 4 week dosing to improve his symptoms further  Multiple studies have demonstrated that a substantial group of patients lose response to ustekinumab 90 mg q8 week dosing  In a recent study of 110 patients with Crohns disease, ustekinumab interval shortening to every 4 weeks resulted in a significant improvement in symptoms and C-reactive protein  1 In one such cohort of patients who achieved corticosteroid-free remission after induction, dose escalation or combined reinduction and dose escalation to ustekinumab 90 mg SQ q4-6 weeks was required in 24% of patients  Over 50% of these patients recaptured response2  In another study, initial clinical response to ustekinumab was achieved in 74% of Crohns disease patients, however, dose escalation was required in 48% of patients and was successful in recapturing response for 61% of patients3  Yet another study of usteknimuab in Crohns disease in a [facility] tertiary care center experience reported a 56% response rate to ustekinumab4  However, 42% of initial responders required dose escalation of ustekinumab  These studies demonstrate among Crohns disease patients that, the requirement for dose escalation is common and is a successful strategy to capture response in the majority of patients   We know from our vast experience with other biologics that these medications are metabolized rapidly in patients with highly active disease and therefore dose escalation is quite commonly required and, in fact, expected in severe disease  Dose escalation of ustekinumab is certainly the most conservative course of action for this patient as we know that he has responded to the mechanism of action of ustekinumab  In addition, changing this patient to an alternative medication prior to a trial of dose escalation could put the patient at risk to develop anti-drug antibodies and may limit future use of ustekinumab  Based on the data presented in this letter and my professional experience, I am advocating that ustekinumab 90mg every 4 weeks be a covered benefit for Mr Bay  I appreciate your consideration in this matter  As my patient is suffering with symptoms at this time that put him at risk to develop serious complications from his disease, I hope that you can expedite this request so that he can be started on therapy as soon as possible  Please feel free to contact my office if any additional information will help clarify this request           Sincerely,      Isabela Mckeon MD        References:    1  Jose SINGH et al, Effectiveness of Ustekinumab Dose Escalation in Patients With Crohn's Disease  Clin Gastroenterol Hepatol  2020 Feb 26  [Epub ahead of print]    2  Ma et al, Long-term Maintenance o Clinical, Endoscopic, and Radiologic response to ustekinumab in Moderate to Severe Crohns disease: Real world experience from a Multicenter Cohort Study  Inflamm Bowel Disease  2017;23:833-839  3  Sally et al, Subcutaneous ustekinumab for the treatment of anti-TNF resistant Crohn's disease--the [facility] experience, J Crohns Colitis  4820;0:4732-76     4  Junaita et al, Ustekinumab use in Crohn's disease: a [facility] tertiary care centre experience, Leeanne Ojeda  2017;52:1354-59      If you have any questions or concerns, please don't hesitate to call      Sincerely,             Omari Neal        CC: No Recipients

## 2022-02-01 NOTE — TELEPHONE ENCOUNTER
Would you be able to draft a letter for this patient? His Stelara is in external review  What this means is they can extend the auth past the 3mo window that it was initially approved for (right now only approved to 3/11/22)  They can also accept additional clinical information and it looks like he has some endoscopic improvement, I think it would benefit him if you would be able to write a letter regarding this and how he has improved on Stelara q 28 days  Please let me know your thoughts  Thank you

## 2022-02-01 NOTE — TELEPHONE ENCOUNTER
Contacted [facility] ([phone]) and spoke with [facility]  I asked why the patient's  Mast has gone to external review if it's approved through 3/11/22? She contacted the appeals department and was told that the reason for it going to external review is to get it approved beyond the current approval dates  I asked if I could submit more information to better the chances of continued approval  I was told yes, and to fax them to [phone]  I was ensured that the patient will be able to fill the medication for the duration of the approval even if the external review comes back as denied  This encounter was approximately 1 hour  I alerted Peter Lek (spouse) of my above communication via her vm

## 2022-02-01 NOTE — TELEPHONE ENCOUNTER
[facility] letter and clinical information to [facility] ([facility] at fax numbers: [phone] and [phone]  Case #: [id number]  Per [facility] documentation a decision should be no later than 3/3/22

## 2022-02-10 ENCOUNTER — CONSULT (OUTPATIENT)
Dept: DERMATOLOGY | Facility: CLINIC | Age: 52
End: 2022-02-10
Payer: COMMERCIAL

## 2022-02-10 VITALS — BODY MASS INDEX: 40.52 KG/M2 | WEIGHT: 283 LBS | TEMPERATURE: 96.7 F | HEIGHT: 70 IN

## 2022-02-10 DIAGNOSIS — K51.919 ULCERATIVE COLITIS WITH COMPLICATION, UNSPECIFIED LOCATION (HCC): ICD-10-CM

## 2022-02-10 DIAGNOSIS — D22.60 MULTIPLE BENIGN MELANOCYTIC NEVI OF UPPER AND LOWER EXTREMITIES AND TRUNK: ICD-10-CM

## 2022-02-10 DIAGNOSIS — D22.5 MULTIPLE BENIGN MELANOCYTIC NEVI OF UPPER AND LOWER EXTREMITIES AND TRUNK: ICD-10-CM

## 2022-02-10 DIAGNOSIS — L91.8 ACROCHORDON: ICD-10-CM

## 2022-02-10 DIAGNOSIS — D22.70 MULTIPLE BENIGN MELANOCYTIC NEVI OF UPPER AND LOWER EXTREMITIES AND TRUNK: ICD-10-CM

## 2022-02-10 DIAGNOSIS — D48.5 NEOPLASM OF UNCERTAIN BEHAVIOR OF SKIN: Primary | ICD-10-CM

## 2022-02-10 DIAGNOSIS — D18.01 CHERRY ANGIOMA: ICD-10-CM

## 2022-02-10 PROCEDURE — 11300 SHAVE SKIN LESION 0.5 CM/<: CPT | Performed by: STUDENT IN AN ORGANIZED HEALTH CARE EDUCATION/TRAINING PROGRAM

## 2022-02-10 PROCEDURE — 88304 TISSUE EXAM BY PATHOLOGIST: CPT | Performed by: STUDENT IN AN ORGANIZED HEALTH CARE EDUCATION/TRAINING PROGRAM

## 2022-02-10 PROCEDURE — 99204 OFFICE O/P NEW MOD 45 MIN: CPT | Performed by: STUDENT IN AN ORGANIZED HEALTH CARE EDUCATION/TRAINING PROGRAM

## 2022-02-10 NOTE — PATIENT INSTRUCTIONS
CASILLAS ANGIOMAS    Assessment and Plan:  Based on a thorough discussion of this condition and the management approach to it (including a comprehensive discussion of the known risks, side effects and potential benefits of treatment), the patient (family) agrees to implement the following specific plan:   Reassured benign    Assessment and Plan:    Cherry angioma, also known as [employer] spots, are benign vascular skin lesions  A "cherry angioma" is a firm red, blue or purple papule, 0 1-1 cm in diameter  When thrombosed, they can appear black in colour until evaluated with a dermatoscope when the red or purple colour is more easily seen  Cherry angioma may develop on any part of the body but most often appear on the scalp, face, lips and trunk  An angioma is due to proliferating endothelial cells; these are the cells that line the inside of a blood vessel  Angiomas can arise in early life or later in life; the most common type of angioma is a cherry angioma  Cherry angiomas are very common in males and females of any age or race  They are more noticeable in white skin than in skin of colour  They markedly increase in number from about the age of 36  There may be a family history of similar lesions  Eruptive cherry angiomas have been rarely reported to be associated with internal malignancy  The cause of angiomas is unknown  Genetic analysis of cherry angiomas has shown that they frequently carry specific somatic missense mutations in the GNAQ and GNA11 (Q209H) genes, which are involved in other vascular and melanocytic proliferations  Cherry angioma is usually diagnosed clinically and no investigations are necessary for the majority of lesions  It has a characteristic red-clod or lobular pattern on dermatoscopy (called lacunar pattern using conventional pattern analysis)  When there is uncertainty about the diagnosis, a biopsy may be performed   The angioma is composed of venules in a thickened papillary dermis  Collagen bundles may be prominent between the lobules  Cherry angiomas are harmless, so they do not usually have to be treated  Occasionally, they are removed to exclude a malignant skin lesion such as a nodular melanoma or because they are irritated or bleeding (and a subsequent risk for infection)  To decrease friction over the lesions, we recommend Neutrogena Daily Defense SPF 50+ at least 3 times a day  MELANOCYTIC NEVI ("Moles")    Assessment and Plan:  Based on a thorough discussion of this condition and the management approach to it (including a comprehensive discussion of the known risks, side effects and potential benefits of treatment), the patient (family) agrees to implement the following specific plan:   Reassured benign   Yearly skin checks     Melanocytic Nevi  Melanocytic nevi ("moles") are caused by collections of the color producing skin cells, or melanocytes, in 1 area in the skin  They can range in color from pink to dark brown and be either raised or flat  Some moles are present at birth (I e , "congenital nevi"), while others come up later in life (i e , "acquired nevi")  Jairo Mallet exposure also stimulates the body to make more moles, ie the more sun you get the more moles you'll grow  Clinically distinguishing a healthy mole from melanoma may be difficult  The "ABCDE's" of moles have been suggested as a means of helping to alert a person to a suspicious mole and the possible increased risk of melanoma  Asymmetry: Healthy moles tend to be symmetric, while melanomas are often asymmetric  Asymmetry means if you draw a line through the mole, the two halves do not match in color, size, shape, or surface texture Any mole that starts to demonstrate "asymmetry" should be examined promptly by a board certified dermatologist      Border: Healthy moles tend to have discrete, even borders    The border of a melanoma often blends into the normal skin and does not sharply delineate the mole from normal skin  Any mole that starts to demonstrate "uneven borders" should be examined promptly     Color: Healthy moles tend to be one color throughout  Melanomas tend to be made up of different colors ranging from dark black, blue, white, or red  Any mole that demonstrates a color change should be examined promptly    Diameter: Healthy moles tend to be smaller than 0 6 cm in size; an exception are "congenital nevi" that can be larger  Melanomas tend to grow and can often be greater than 0 6 cm (1/4 of an inch, or the size of a pencil eraser)  This is only a guideline, and many normal moles may be larger than 0 6 cm without being unhealthy  Any mole that starts to change in size (small to bigger or bigger to smaller) should be examined promptly    Evolving: Healthy moles tend to "stay the same "  Melanomas may often show signs of change or evolution such as a change in size, shape, color, or elevation  Any mole that starts to itch, bleed, crust, burn, hurt, or ulcerate or demonstrate a change or evolution should be examined promptly by a board certified dermatologist       What are atypical moles or dysplastic nevi? Dysplastic moles are moles that have some of the ABCDE  changes listed above but  are not cancerous  Sometimes a biopsy and microscopic examination are needed to determine the difference  They may indicate an increased risk of melanoma in that person, especially if there is a family history of melanoma  What is a Melanoma? The main concern when looking at a new or changing mole it to evaluate whether it may be a melanoma  The appearance of a "new mole" remains one of the most reliable methods for identifying a malignant melanoma  A melanoma is a type of skin cancer that can be deadly if it spreads throughout the body  The prognosis of a Melanoma depends on how deep it has penetrated in the skin    If caught early, they generally will not have had time to grow into the deeper layers of the skin and they cure rate is then very high  Once the melanoma grows deeper into the skin, the cure rate drops dramatically  Therefore, early detection and removal of a malignant melanoma results in a much better chance of complete cure  ACRMONEORDON ("SKIN TAG")    Assessment and Plan:  Based on a thorough discussion of this condition and the management approach to it (including a comprehensive discussion of the known risks, side effects and potential benefits of treatment), the patient (family) agrees to implement the following specific plan:   Reassured benign    Skin tags are common, soft, harmless skin lesions that are also called, in the appropriate settings, papillomas, fibroepithelial polyps, and soft fibromas  They are made up of loosely arranged collagen fibers and blood vessels surrounded by a thickened or thinned-out epidermis  Skin tags tend to develop in both men and women as we grow older  They are usually found on the skin folds (neck, armpits, groin)  It is not known what specifically causes skin tags  Certain factors, though, do appear to play a role:   Chaffing and irritation from skin rubbing together   High levels of growth factors (as seen, for example, in pregnancy or in acromegaly/gigantism)   Insulin resistance   Human papillomavirus (wart virus)    We discussed that most skin tags do not need to be treated unless they are specifically causing the patient physical distress or limitation or pose a risk for a larger problem such as an infection that forms secondary to excoriation or chronic irritation      We had a thorough discussion of treatment options and specific risks (including that any procedural treatment may not be covered by insurance and would then be the patient's responsibility) and benefits/alternatives including but not limited to the following:   Cryotherapy (freezing)   Shave removal   Surgical excision (snip excision with scissors)   Electrosurgery   Ligation (we do not do this procedure and counseled against it due to risk of tissue necrosis and infection)      NEOPLASM OF UNCERTAIN BEHAVIOR OF SKIN    Assessment and Plan:   I have discussed with the patient that a sample of skin via a "skin biopsy would be potentially helpful to further make a specific diagnosis under the microscope   Based on a thorough discussion of this condition and the management approach to it (including a comprehensive discussion of the known risks, side effects and potential benefits of treatment), the patient (family) agrees to implement the following specific plan:    o Procedure:  Skin Biopsy  After a thorough discussion of treatment options and risk/benefits/alternatives (including but not limited to local pain, scarring, dyspigmentation, blistering, possible superinfection, and inability to confirm a diagnosis via histopathology), verbal and written consent were obtained and portion of the rash was biopsied for tissue sample  See below for consent that was obtained from patient and subsequent Procedure Note  INFORMED CONSENT DISCUSSION AND POST-OPERATIVE INSTRUCTIONS FOR PATIENT    I   RATIONALE FOR PROCEDURE  I understand that a skin biopsy allows the Dermatologist to test a lesion or rash under the microscope to obtain a diagnosis  It usually involves numbing the area with numbing medication and removing a small piece of skin; sometimes the area will be closed with sutures  In this specific procedure, sutures are not usually needed  If any sutures are placed, then they are usually need to be removed in 2 weeks or less  I understand that my Dermatologist recommends that a skin "shave" biopsy be performed today  A local anesthetic, similar to the kind that a dentist uses when filling a cavity, will be injected with a very small needle into the skin area to be sampled    The injected skin and tissue underneath "will go to sleep and become numb so no pain should be felt afterwards  An instrument shaped like a tiny "razor blade" (shave biopsy instrument) will be used to cut a small piece of tissue and skin from the area so that a sample of tissue can be taken and examined more closely under the microscope  A slight amount of bleeding will occur, but it will be stopped with direct pressure and a pressure bandage and any other appropriate methods  I understands that a scar will form where the wound was created  Surgical ointment will be applied to help protect the wound  Sutures are not usually needed  II   RISKS AND POTENTIAL COMPLICATIONS   I understand the risks and potential complications of a skin biopsy include but are not limited to the following:   Bleeding   Infection   Pain   Scar/keloid   Skin discoloration   Incomplete Removal   Recurrence   Nerve Damage/Numbness/Loss of Function   Allergic Reaction to Anesthesia   Biopsies are diagnostic procedures and based on findings additional treatment or evaluation may be required   Loss or destruction of specimen resulting in no additional findings    My Dermatologist has explained to me the nature of the condition, the nature of the procedure, and the benefits to be reasonably expected compared with alternative approaches  My Dermatologist has discussed the likelihood of major risks or complications of this procedure including the specific risks listed above, such as bleeding, infection, and scarring/keloid  I understand that a scar is expected after this procedure  I understand that my physician cannot predict if the scar will form a "keloid," which extends beyond the borders of the wound that is created  A keloid is a thick, painful, and bumpy scar  A keloid can be difficult to treat, as it does not always respond well to therapy, which includes injecting cortisone directly into the keloid every few weeks    While this usually reduces the pain and size of the scar, it does not eliminate it  I understand that photographs may be taken before and after the procedure  These will be maintained as part of the medical providers confidential records and may not be made available to me  I further authorize the medical provider to use the photographs for teaching purposes or to illustrate scientific papers, books, or lectures if in his/her judgment, medical research, education, or science may benefit from its use  I have had an opportunity to fully inquire about the risks and benefits of this procedure and its alternatives  I have been given ample time and opportunity to ask questions and to seek a second opinion if I wished to do so  I acknowledge that there have specifically been no guarantees as to the cosmetic results from the procedure  I am aware that with any procedure there is always the possibility of an unexpected complication  III  POST-PROCEDURAL CARE (WHAT YOU WILL NEED TO DO "AFTER THE BIOPSY" TO OPTIMIZE HEALING)     Keep the area clean and dry  Try NOT to remove the bandage or get it wet for the first 24 hours   Gently clean the area and apply surgical ointment (such as Vaseline petrolatum ointment, which is available "over the counter" and not a prescription) to the biopsy site for up to 2 weeks straight  This acts to protect the wound from the outside world   Sutures are not usually placed in this procedure  If any sutures were placed, return for suture removal as instructed (generally 1 week for the face, 2 weeks for the body)   Take Acetaminophen (Tylenol) for discomfort, if no contraindications  Ibuprofen or aspirin could make bleeding worse   Call our office immediately for signs of infection: fever, chills, increased redness, warmth, tenderness, discomfort/pain, or pus or foul smell coming from the wound  WHAT TO DO IF THERE IS ANY BLEEDING?   If a small amount of bleeding is noticed, place a clean cloth over the area and apply firm pressure for ten minutes  Check the wound after 10 minutes of direct pressure  If bleeding persists, try one more time for an additional 10 minutes of direct pressure on the area  If the bleeding becomes heavier or does not stop after the second attempt, or if you have any other questions about this procedure, then please call your [address] [place] [address]'s Dermatologist by calling [phone] (SKIN)  I hereby acknowledge that I have reviewed and verified the site with my Dermatologist and have requested and authorized my Dermatologist to proceed with the procedure

## 2022-02-17 ENCOUNTER — TELEPHONE (OUTPATIENT)
Dept: DERMATOLOGY | Facility: CLINIC | Age: 52
End: 2022-02-17

## 2022-02-17 NOTE — TELEPHONE ENCOUNTER
Patients GI provider:  Dr Jorge Chapman to return call: [phone]    Reason for call: Pt's wife named Shahram Cameron called requesting to speak with you regarding denial letter and next step       Scheduled procedure/appointment date if applicable: Apt/procedure n/a

## 2022-02-17 NOTE — TELEPHONE ENCOUNTER
Per Dr Jacobo rIby advised pt bx showed benign skin tag; if has any other problems or concerns to please call the office; lvm for pt

## 2022-02-18 NOTE — TELEPHONE ENCOUNTER
Called [facility] at [phone] and spoke with Janki Bernard she confirmed the denial of the independent review  I confirmed that the patient can continue to fill until 3/11/22  Next step is a peer to peer  Dr Chepe Tovar: When are you available next week for a peer to peer?

## 2022-02-18 NOTE — TELEPHONE ENCOUNTER
Called spouse back and left a vm indicating that I'll be calling [facility] Amy and seeing what our next steps need to be  Then I will update her

## 2022-02-18 NOTE — TELEPHONE ENCOUNTER
Called [facility] and set up a peer to peer with [facility]  She confirmed correct date, time, and phone  Relayed that this was set up to spouses vm     Dr Cornelius Reaves: It's all set up for Monday anytime after 12p, they will call [phone]  Please let me know the outcome of this call  Thank you!

## 2022-02-22 NOTE — TELEPHONE ENCOUNTER
Notification from provider that they did not call him for a peer to peer yesterday  Relayed this to the patient's spouse and that I will set up another peer to peer then reach out with the determination  Patient is due for next injection on 3/11/22, spouse has requested another prefilled syringe for delivery on 3/4  I requested a call if there is any problem getting this dose

## 2022-02-22 NOTE — TELEPHONE ENCOUNTER
Called into [facility] and spoke to [place] (appeals department, pharmacist)  I was told the reason that Dr Josh Tavarez wasn't called was bc the decision on Stelara q 28 days is federally binding and it cannot be reopened  Therefore a peer to peer should have never been scheduled with me on 2/18  I was told that we could complete another PA and it should be denied right away the call [facility] and complete another appeal marked urgent that should get us to a peer to peer, but it isn't a sure approval      I then reached out to the drug rep and explained the patient's denial  She recommended the patient try to get an approval through the [employer] patient assistant foundation  She said that if that does work she can provide the patient samples to cover him as long as he's approved for q 56 day dosing  She would deliver them in 3 month increments  I called the patient's spouse and explained my recent communications  She was not happy and thinks that it's horrible that they will not cover the patient's medication  She went onto [employer] and the patient isn't qualified for the program  We went over possible alternative options, Stelara q 56 days, trying for Stelara q 28 days, and possibly another biologic, all pending the physician's opinion  She spoke with the patient about these options  Ultimately, the patient would like to have a script for Stelara q 56 days and then receive free sample Stelara prefilled syringes from the drug rep (6 prefilled syringes yearly) so he may inject q month  I said that I will go over this with Dr Josh Tavarez as he will decide on the prescription  Then I will give the spouse a call back on how we'll proceed  I was then told by reception that Xuan Woods is on the phone for me again  Re Hansen had called in claiming that he just spoke with me  I denied the possibility  Relayed the above to Dr Josh Tavarez    Per Dr Josh Tavarez: complete PA for Stelara q 56 days and have the drug rep cover 6 prefilled syringes for a year

## 2022-03-08 NOTE — TELEPHONE ENCOUNTER
Reached out to Christopher Rock via phone to get an update with the patient's Stelara  They did get a delivery of one Stelara prefilled syringe and set to inject on 3/11/22  Since the patient received the Stelara prefilled syringe in the upcoming days I will complete a new Stelara PA and keep them updated the determination and on the samples too  Christopher Rock was extremely appreciative with the help I have given the patient

## 2022-03-21 NOTE — TELEPHONE ENCOUNTER
Connected with [employer] rep at [address] [place] [address]  She has completed PA for Stelara 90mg q 56 days and took information from the last PA completed (nothing has changed)  I requested urgent determination and Bill Mistry said that turnaround time is 24-48hrs  I informed Thuan Vicente (spouse) of my above communication with [facility] and said that I'll f/u later this week with her

## 2022-03-24 RX ORDER — USTEKINUMAB 90 MG/ML
90 INJECTION, SOLUTION SUBCUTANEOUS
Qty: 1 ML | Refills: 6 | Status: SHIPPED | OUTPATIENT
Start: 2022-03-24

## 2022-03-24 NOTE — TELEPHONE ENCOUNTER
Connected with [facility] Amy ([phone]) and spoke with Ashley who informed me that the patient's Stelara 90mg pfs given q 56 days was approved, 3/22/22-3/22/23  I requested that she fax the approval letter to me  She agreed

## 2022-03-25 NOTE — TELEPHONE ENCOUNTER
Connected with spouse, Pam Rebolledo via phone and relayed the approval dates and how to order the medication  She will drop by to  Ed's Stelara samples on Monday  Refill for Stelara refills x6 escribed

## 2022-03-28 ENCOUNTER — DOCUMENTATION (OUTPATIENT)
Dept: GASTROENTEROLOGY | Facility: MEDICAL CENTER | Age: 52
End: 2022-03-28

## 2022-03-28 NOTE — TELEPHONE ENCOUNTER
Spouse arrived to [facility] office to  3 samples of Stelara 90mg pfs inject under skin q 28 days  Instructions given  Reminded spouse to call once they are down to 1 sample pen left      She stated, "they already let me fill the Stelara too "

## 2022-03-28 NOTE — PROGRESS NOTES
Spouse arrived to [facility] office to  3 samples of Stelara 90mg pfs inject under skin q 28 days  Instructions given  Reminded spouse to call once they are down to 1 sample pen left  MAR

## 2022-11-14 ENCOUNTER — TELEPHONE (OUTPATIENT)
Dept: GASTROENTEROLOGY | Facility: AMBULARY SURGERY CENTER | Age: 52
End: 2022-11-14

## 2022-11-14 NOTE — TELEPHONE ENCOUNTER
Patients GI provider:  Dr Carmen Beckwith    Number to return call: [phone]     Reason for call: Pt's wife named Shahram Cameron called requesting to speak with Shikha regarding stelara       Scheduled procedure/appointment date if applicable: Apt/procedure n/a

## 2022-11-16 NOTE — TELEPHONE ENCOUNTER
Pt's wife calling again requesting to speak w/ Fatemeh regarding pt's Stelara  Please reach out to her at [phone]

## 2022-11-17 NOTE — TELEPHONE ENCOUNTER
Connected with spouse via phone  She said that Ed needs the additional samples at this time  I said that I will reach out to obtain them and then, as requested, reach out to Ed to pick them up

## 2022-11-23 ENCOUNTER — DOCUMENTATION (OUTPATIENT)
Dept: GASTROENTEROLOGY | Facility: CLINIC | Age: 52
End: 2022-11-23

## 2022-11-23 NOTE — PROGRESS NOTES
Patient came into [facility], I provided him with 3 samples of Stelara 90mg/ml single dose prefilled syringe and reviewed medication instructions with him  We reviewed how to inject the medication (SubQ) and frequency (every 28 days ) Written instruction given as well  Reminded patient to call once he is down to 1 sample pen left  Stelara 90mg/ml single dose prefilled syringe x 3 boxes  Lot [id number] Exp 12/2024  Lot [id number] Exp 12/2024  Lot [id number] Exp 12/2024

## 2023-02-22 ENCOUNTER — TELEPHONE (OUTPATIENT)
Dept: GASTROENTEROLOGY | Facility: CLINIC | Age: 53
End: 2023-02-22

## 2023-02-27 NOTE — TELEPHONE ENCOUNTER
Pt needs refill of his Stelara, hasnt been seen since 09/2021 can you please call pt to schedule an office appt   ty
Scheduled 3/9/23 [facility] Dr Olena Forbes
auth postponed till 3/9/23so have new clinicals to submit with form
Spontaneous, unlabored and symmetrical

## 2023-03-09 ENCOUNTER — OFFICE VISIT (OUTPATIENT)
Dept: GASTROENTEROLOGY | Facility: CLINIC | Age: 53
End: 2023-03-09

## 2023-03-09 VITALS
SYSTOLIC BLOOD PRESSURE: 120 MMHG | WEIGHT: 273 LBS | TEMPERATURE: 98.5 F | BODY MASS INDEX: 39.08 KG/M2 | DIASTOLIC BLOOD PRESSURE: 80 MMHG | HEART RATE: 77 BPM | HEIGHT: 70 IN

## 2023-03-09 DIAGNOSIS — R19.7 DIARRHEA, UNSPECIFIED TYPE: ICD-10-CM

## 2023-03-09 DIAGNOSIS — E53.8 LOW VITAMIN B12 LEVEL: ICD-10-CM

## 2023-03-09 DIAGNOSIS — K51.919 ULCERATIVE COLITIS WITH COMPLICATION, UNSPECIFIED LOCATION (HCC): Primary | ICD-10-CM

## 2023-03-09 DIAGNOSIS — R79.89 LOW VITAMIN D LEVEL: ICD-10-CM

## 2023-03-09 DIAGNOSIS — D84.9 IMMUNOCOMPROMISED STATE (HCC): ICD-10-CM

## 2023-03-09 RX ORDER — USTEKINUMAB 90 MG/ML
90 INJECTION, SOLUTION SUBCUTANEOUS
Qty: 1 ML | Refills: 6 | Status: SHIPPED | OUTPATIENT
Start: 2023-03-09 | End: 2023-03-09 | Stop reason: SDUPTHER

## 2023-03-09 RX ORDER — USTEKINUMAB 90 MG/ML
90 INJECTION, SOLUTION SUBCUTANEOUS
Qty: 1 ML | Refills: 6 | Status: SHIPPED | OUTPATIENT
Start: 2023-03-09

## 2023-03-09 NOTE — PATIENT INSTRUCTIONS
Scheduled date of colonoscopy (as of today):05/01/2023  Physician performing colonoscopy:Angelica  Location of colonoscopy:[facility]  Bowel prep reviewed with patient:Miralax /Ducolax  Instructions reviewed with patient by:Sangeeta  Clearances:  N/A

## 2023-03-09 NOTE — PROGRESS NOTES
[facility] - Outpatient Follow-up Note  Genna Decker 47 y o  male MRN: [id number]  Encounter: [id number]          ASSESSMENT AND PLAN:    Genna Decker is a 47 y o  male with ulcerative colitis, previously on Humira, Andi Adrian, now currently on Stelara and last seen in September 2021 who presents for follow-up  He is doing very well on Stelara every 4 weeks (he is receiving additional medication via the company)  He feels the best he has in a long time  Colonoscopy from December 2021 with mild localized erythema and granular mucosa in the rectum and rectosigmoid colon as well as several pseudopolyps in the rectosigmoid colon, pancolonic biopsies obtained  Biopsies with benign colonic mucosa with no pathologic abnormality through much of the colon, but mild chronic active colitis at 25 cm, inflammatory polyp in the rectum, chronic inactive colitis in the rectum at 15 cm  MRA from August 2019 with inflammation in the rectum and sigmoid but no evidence of fibrostenotic disease  Prior HIDA scan with CCK normal  Last Stelara level 0 6 with no antidrug antibodies back in November 2021  Prior blood work from November 2021 with normal CMP, low vitamin D at 14 9, B12 at 491, CBC with normal white blood cell count but hemoglobin 11 4, MCV 83, normal platelets  CRP 12 6  Quant gold negative at that time  1  Ulcerative colitis with complication, unspecified location ([facility] [address] )    2  Immunocompromised state ([facility] )    3  Low vitamin B12 level    4  Low vitamin D level    5  Diarrhea, unspecified type        Orders Placed This Encounter   Procedures   • CBC and differential   • Comprehensive metabolic panel   • C-reactive protein   • Quantiferon TB Gold Plus   • Chronic Hepatitis Panel   • Vitamin B12   • Vitamin D 25 hydroxy   • Colonoscopy     Continue Stelara every 4 weeks  Next blood work prior to the next injection    Check CBC, CMP, CRP, vitamin D, vitamin B12, iron studies, quant gold, hepatitis panel  Next quant gold and hepatitis panel due now  Next colonoscopy due now    Avoid live virus vaccines  Yearly flu shot  COVID vaccine and booster  Pneumonia vaccine  Shingrix  Routine skin exams with the dermatologist    ______________________________________________________________________    SUBJECTIVE:    Vargas Painting is a 47 y o  male who presents with complaint of UC  He states that Felecia Paige has been "life changing" for him in a positive way  He has been using Stelara q4 weeks he feels has returned him to "normal " He reports a 50% decrease in frequency of BMs daily  He is now having 6 formed BMs daily, which has decreased form 15+ daily when his disease was at his worst  Since his last visit in 09/2021, he denies any flares of UC and has not seen any blood in his stool  He is agreeable to continuing prescription, further lab work, and further colonoscopies  REVIEW OF SYSTEMS IS OTHERWISE NEGATIVE  10 point ROS reviewed and negative, except as above      Historical Information   Past Medical History:   Diagnosis Date   • Anxiety    • Colon polyp    • Pneumonia    • Ulcerative colitis ([address] )    • Vertigo      Past Surgical History:   Procedure Laterality Date   • COLONOSCOPY N/A 4/4/2016    Procedure: COLONOSCOPY;  Surgeon: Yarelis Mata MD;  Location: [facility]; Service:    • EGD AND COLONOSCOPY N/A 9/11/2017    Procedure: EGD AND COLONOSCOPY;  Surgeon: Yarelis Mata MD;  Location:  GI LAB; Service: Gastroenterology   • HERNIA REPAIR      as infant   • NASAL SEPTUM SURGERY     • [place] COLONOSCOPY FLX DX W/COLLJ SPEC WHEN PFRMD N/A 3/29/2019    Procedure: COLONOSCOPY;  Surgeon: Yarelis Mata MD;  Location: [facility] GI [facility];   Service: Gastroenterology     Social History   Social History     Substance and Sexual Activity   Alcohol Use Yes    Comment: occationally     Social History     Substance and Sexual Activity   Drug Use No     Social History     Tobacco Use Smoking Status Former   • Types: Cigarettes   • Quit date: 3/29/2004   • Years since quittin 9   Smokeless Tobacco Never     Family History   Problem Relation Age of Onset   • Hypertension Father    • Cancer Maternal Grandfather    • Diabetes Paternal Grandmother    • Hypertension Paternal Grandmother    • Cancer Paternal Grandfather    • Hypertension Paternal Grandfather        Meds/Allergies       Current Outpatient Medications:   •  sertraline (ZOLOFT) 50 mg tablet  •  Stelara 90 MG/ML subcutaneous injection    No Known Allergies        Objective     Blood pressure 120/80, pulse 77, temperature 98 5 °F (36 9 °C), temperature source Tympanic, height 5' 10" (1 778 m), weight 124 kg (273 lb)  Body mass index is 39 17 kg/m²  PHYSICAL EXAMINATION:    General Appearance:   Alert, cooperative, no distress   HEENT:  Normocephalic, atraumatic, anicteric  Neck supple, symmetrical, trachea midline  Lungs:   Equal chest rise and unlabored breathing, normal effort, no coughing  Cardiovascular:   No visualized JVD  Abdomen:   No abdominal distension  Skin:   No jaundice, rashes, or lesions  Musculoskeletal:   Normal range of motion visualized  Psych:  Normal affect and normal insight  Neuro:  Alert and appropriate  Lab Results:   No visits with results within 1 Day(s) from this visit     Latest known visit with results is:   Consult on 02/10/2022   Component Date Value   • Case Report 02/10/2022                      Value:Surgical Pathology Report                         Case: [id number]                                   Authorizing Provider:  China Looney MD         Collected:           02/10/2022 1212              Ordering Location:     [facility]      Received:            02/10/2022 [address] [facility]                                                                Pathologist:           China Looney MD Specimen:    Skin, Other, left thigh                                                                   • Final Diagnosis 02/10/2022                      Value: This result contains rich text formatting which cannot be displayed here  • Additional Information 02/10/2022                      Value: This result contains rich text formatting which cannot be displayed here  • Gross Description 02/10/2022                      Value: This result contains rich text formatting which cannot be displayed here  • Clinical Information 02/10/2022                      Value:SPECIMEN A: Anatomic Location: left thigh Skin; Procedure Type: Shave Removal  49y o  year old  Male with a Morphological Description: 0 5 x  05 cm pink papule; Differential diagnosis: Rule out; Skin tag     Attn: derm path       Lab Results   Component Value Date    WBC 7 05 11/24/2021    HGB 11 4 (L) 11/24/2021    HCT 38 1 11/24/2021    MCV 83 11/24/2021     11/24/2021       Lab Results   Component Value Date    SODIUM 137 11/24/2021    K 4 4 11/24/2021     11/24/2021    CO2 25 11/24/2021    AGAP 7 11/24/2021    BUN 14 11/24/2021    CREATININE 0 90 11/24/2021    GLUC 103 11/24/2021    CALCIUM 9 8 11/24/2021    AST 14 11/24/2021    ALT 21 11/24/2021    ALKPHOS 71 11/24/2021    TP 7 7 11/24/2021    TBILI 0 27 11/24/2021    EGFR 99 11/24/2021       Lab Results   Component Value Date    CRP 12 6 (H) 11/24/2021       No results found for: RBP5LKRSTZTV, TSH    No results found for: IRON, TIBC, FERRITIN    Radiology Results:   No results found

## 2023-05-01 ENCOUNTER — HOSPITAL ENCOUNTER (OUTPATIENT)
Dept: GASTROENTEROLOGY | Facility: MEDICAL CENTER | Age: 53
Setting detail: OUTPATIENT SURGERY
Discharge: HOME | End: 2023-05-01
Attending: INTERNAL MEDICINE | Admitting: INTERNAL MEDICINE

## 2023-05-01 ENCOUNTER — ANESTHESIA (OUTPATIENT)
Dept: GASTROENTEROLOGY | Facility: MEDICAL CENTER | Age: 53
End: 2023-05-01

## 2023-05-01 ENCOUNTER — ANESTHESIA EVENT (OUTPATIENT)
Dept: GASTROENTEROLOGY | Facility: MEDICAL CENTER | Age: 53
End: 2023-05-01

## 2023-05-01 VITALS
TEMPERATURE: 97.5 F | OXYGEN SATURATION: 98 % | RESPIRATION RATE: 16 BRPM | BODY MASS INDEX: 38.65 KG/M2 | HEIGHT: 70 IN | DIASTOLIC BLOOD PRESSURE: 70 MMHG | WEIGHT: 270 LBS | HEART RATE: 89 BPM | SYSTOLIC BLOOD PRESSURE: 126 MMHG

## 2023-05-01 DIAGNOSIS — K51.919 ULCERATIVE COLITIS WITH COMPLICATION, UNSPECIFIED LOCATION (HCC): ICD-10-CM

## 2023-05-01 RX ORDER — SODIUM CHLORIDE 9 MG/ML
125 INJECTION, SOLUTION INTRAVENOUS CONTINUOUS
Status: DISCONTINUED | OUTPATIENT
Start: 2023-05-01 | End: 2023-05-05 | Stop reason: HOSPADM

## 2023-05-01 RX ORDER — PROPOFOL 10 MG/ML
INJECTION, EMULSION INTRAVENOUS AS NEEDED
Status: DISCONTINUED | OUTPATIENT
Start: 2023-05-01 | End: 2023-05-01

## 2023-05-01 RX ORDER — LIDOCAINE HYDROCHLORIDE 20 MG/ML
INJECTION, SOLUTION EPIDURAL; INFILTRATION; INTRACAUDAL; PERINEURAL AS NEEDED
Status: DISCONTINUED | OUTPATIENT
Start: 2023-05-01 | End: 2023-05-01

## 2023-05-01 RX ADMIN — PROPOFOL 30 MG: 10 INJECTION, EMULSION INTRAVENOUS at 13:14

## 2023-05-01 RX ADMIN — PROPOFOL 40 MG: 10 INJECTION, EMULSION INTRAVENOUS at 13:08

## 2023-05-01 RX ADMIN — PROPOFOL 30 MG: 10 INJECTION, EMULSION INTRAVENOUS at 13:09

## 2023-05-01 RX ADMIN — SODIUM CHLORIDE 125 ML/HR: 0.9 INJECTION, SOLUTION INTRAVENOUS at 12:53

## 2023-05-01 RX ADMIN — PROPOFOL 120 MG: 10 INJECTION, EMULSION INTRAVENOUS at 13:07

## 2023-05-01 RX ADMIN — PROPOFOL 30 MG: 10 INJECTION, EMULSION INTRAVENOUS at 13:10

## 2023-05-01 RX ADMIN — PROPOFOL 30 MG: 10 INJECTION, EMULSION INTRAVENOUS at 13:19

## 2023-05-01 RX ADMIN — PROPOFOL 30 MG: 10 INJECTION, EMULSION INTRAVENOUS at 13:17

## 2023-05-01 RX ADMIN — PROPOFOL 30 MG: 10 INJECTION, EMULSION INTRAVENOUS at 13:12

## 2023-05-01 RX ADMIN — LIDOCAINE HYDROCHLORIDE 100 MG: 20 INJECTION, SOLUTION EPIDURAL; INFILTRATION; INTRACAUDAL; PERINEURAL at 13:07

## 2023-05-01 NOTE — ANESTHESIA PREPROCEDURE EVALUATION
Procedure:  COLONOSCOPY    (+) BMI 38 7  Physical Exam    Airway    Mallampati score: III  TM Distance: >3 FB  Neck ROM: full     Dental       Cardiovascular  Rhythm: regular, Rate: normal,     Pulmonary  Breath sounds clear to auscultation,     Other Findings        Anesthesia Plan  ASA Score- 2     Anesthesia Type- IV sedation with anesthesia with ASA Monitors  Additional Monitors:   Airway Plan:           Plan Factors-Exercise tolerance (METS): >4 METS  Chart reviewed  Existing labs reviewed  Patient is not a current smoker  Induction- intravenous  Postoperative Plan-     Informed Consent- Anesthetic plan and risks discussed with patient

## 2023-05-01 NOTE — ANESTHESIA POSTPROCEDURE EVALUATION
"Post-Op Assessment Note    CV Status:  Stable    Pain management: adequate     Mental Status:  Alert and awake   Hydration Status:  Euvolemic   PONV Controlled:  Controlled   Airway Patency:  Patent      Post Op Vitals Reviewed: Yes      Staff: Anesthesiologist         No notable events documented      BP      Temp      Pulse     Resp      SpO2      /70   Pulse 89   Temp 97 5 °F (36 4 °C) (Temporal)   Resp 16   Ht 5' 10\" (1 778 m)   Wt 122 kg (270 lb)   SpO2 98%   BMI 38 74 kg/m²     "

## 2023-05-01 NOTE — H&P
History and Physical - [facility]  Reanna Ahuja 47 y o  male MRN: [id number]                  HPI: Reanna Ahuja is a 47y o  year old male who presents for UC      REVIEW OF SYSTEMS: Per the HPI, and otherwise unremarkable  Historical Information   Past Medical History:   Diagnosis Date    Anxiety     Colon polyp     Pneumonia     Ulcerative colitis ([address] )     Vertigo      Past Surgical History:   Procedure Laterality Date    COLONOSCOPY N/A 2016    Procedure: COLONOSCOPY;  Surgeon: Viridiana Marks MD;  Location: [facility]; Service:     EGD AND COLONOSCOPY N/A 2017    Procedure: EGD AND COLONOSCOPY;  Surgeon: Viridiana Marks MD;  Location:  GI LAB; Service: Gastroenterology    HERNIA REPAIR      as infant    NASAL SEPTUM SURGERY      [place] COLONOSCOPY FLX DX W/COLLJ SPEC WHEN PFRMD N/A 3/29/2019    Procedure: COLONOSCOPY;  Surgeon: Viridiana Marks MD;  Location: [facility]; Service: Gastroenterology     Social History   Social History     Substance and Sexual Activity   Alcohol Use Yes    Comment: occationally     Social History     Substance and Sexual Activity   Drug Use No     Social History     Tobacco Use   Smoking Status Former    Types: Cigarettes    Quit date: 3/29/2004    Years since quittin 1   Smokeless Tobacco Never     Family History   Problem Relation Age of Onset    Hypertension Father     Cancer Maternal Grandfather     Diabetes Paternal Grandmother     Hypertension Paternal Grandmother     Cancer Paternal Grandfather     Hypertension Paternal Grandfather        Meds/Allergies     (Not in a hospital admission)      No Known Allergies    Objective     There were no vitals taken for this visit  PHYSICAL EXAMINATION:    General Appearance:   Alert, cooperative, no distress   HEENT:  Normocephalic, atraumatic, anicteric  Neck supple, symmetrical, trachea midline     Lungs:   Equal chest rise and unlabored breathing, normal effort, no coughing  Cardiovascular:   No visualized JVD  Abdomen:   No abdominal distension  Skin:   No jaundice, rashes, or lesions  Musculoskeletal:   Normal range of motion visualized  Psych:  Normal affect and normal insight  Neuro:  Alert and appropriate  ASSESSMENT/PLAN:  This is a 47y o  year old male here for colonoscopy, and he is stable and optimized for his procedure

## 2023-05-05 DIAGNOSIS — K51.919 ULCERATIVE COLITIS WITH COMPLICATION, UNSPECIFIED LOCATION (HCC): Primary | ICD-10-CM

## 2023-05-30 ENCOUNTER — TELEPHONE (OUTPATIENT)
Dept: GASTROENTEROLOGY | Facility: CLINIC | Age: 53
End: 2023-05-30

## 2023-05-30 NOTE — TELEPHONE ENCOUNTER
Patients GI provider:  Dr Lucrecia Del Cid    Number to return call: [phone]    Reason for call: Patients wife contacting office with questions about Andre Alanis   Please contact patient to further assist

## 2023-05-30 NOTE — TELEPHONE ENCOUNTER
Connected with Ethel Patel the patient's wife and the patient is in need of additional Stelara samples  I placed a phone call to our pharmaceutical rep  I said that I will f/u with her once they arrive

## 2023-06-07 NOTE — TELEPHONE ENCOUNTER
Called the patient's wife and left a vm indicating that Ed's samples came in and he can pick them up at the [place] [address] office location

## 2023-06-14 NOTE — TELEPHONE ENCOUNTER
Called and left a message on the patient's voice mail that [facility] are ready for  [facility]  I provided the address

## 2023-06-16 NOTE — TELEPHONE ENCOUNTER
Wife stopped in and picked up the patient's sampled medication:    1  Stelara 90mg pfs / Lot: [id number] / Exp: 10/2025  2  Stelara 90mg pfs / Lot: [id number] / Exp: 10/2024  3   Stelara 90mg pfs / Lot: [id number] / Exp: 10/2024

## 2023-12-11 ENCOUNTER — TELEPHONE (OUTPATIENT)
Dept: GASTROENTEROLOGY | Facility: CLINIC | Age: 53
End: 2023-12-11

## 2023-12-11 NOTE — TELEPHONE ENCOUNTER
Rose called in and reported that the patient is doing well and is in need of Stelara samples. I agreed to order some and will reach out when they come in. She was thankful.

## 2023-12-19 NOTE — TELEPHONE ENCOUNTER
Called patient's wife, reached voicemail, left message samples are ready for  and to call back to confirm date/location of .

## 2023-12-20 NOTE — TELEPHONE ENCOUNTER
Called patient, reached voicemail, left message samples are ready for  and to call back to confirm date/location of .

## 2023-12-22 ENCOUNTER — DOCUMENTATION (OUTPATIENT)
Dept: GASTROENTEROLOGY | Facility: CLINIC | Age: 53
End: 2023-12-22

## 2023-12-22 NOTE — PROGRESS NOTES
Patient' wife came into the office. Verified patient's name and . Provided three samples of Stelara single dose prefilled syringe 90 mg/ml. Lot [id number] Exp 2026. Patient's wife aware how pt to take medication Inject 1 ml (90 mg total) under the skin every 56 days.

## 2024-02-28 ENCOUNTER — TELEPHONE (OUTPATIENT)
Dept: GASTROENTEROLOGY | Facility: CLINIC | Age: 54
End: 2024-02-28

## 2024-02-28 NOTE — TELEPHONE ENCOUNTER
2/28 pts auth is coming due for Stelara, has not been seen in year. Can you plz call pt to sched office visit TY

## 2024-02-29 ENCOUNTER — TELEPHONE (OUTPATIENT)
Dept: GASTROENTEROLOGY | Facility: CLINIC | Age: 54
End: 2024-02-29

## 2024-03-07 ENCOUNTER — OFFICE VISIT (OUTPATIENT)
Dept: GASTROENTEROLOGY | Facility: CLINIC | Age: 54
End: 2024-03-07
Payer: COMMERCIAL

## 2024-03-07 ENCOUNTER — TELEPHONE (OUTPATIENT)
Dept: GASTROENTEROLOGY | Facility: CLINIC | Age: 54
End: 2024-03-07

## 2024-03-07 VITALS
WEIGHT: 283 LBS | TEMPERATURE: 96.1 F | DIASTOLIC BLOOD PRESSURE: 79 MMHG | HEART RATE: 93 BPM | HEIGHT: 70 IN | SYSTOLIC BLOOD PRESSURE: 155 MMHG | OXYGEN SATURATION: 98 % | BODY MASS INDEX: 40.52 KG/M2

## 2024-03-07 DIAGNOSIS — K51.919 ULCERATIVE COLITIS WITH COMPLICATION, UNSPECIFIED LOCATION (HCC): Primary | ICD-10-CM

## 2024-03-07 DIAGNOSIS — E55.9 VITAMIN D DEFICIENCY: ICD-10-CM

## 2024-03-07 DIAGNOSIS — D84.9 IMMUNOCOMPROMISED STATE (HCC): ICD-10-CM

## 2024-03-07 PROCEDURE — 99214 OFFICE O/P EST MOD 30 MIN: CPT | Performed by: DIETITIAN, REGISTERED

## 2024-03-07 RX ORDER — ALBUTEROL SULFATE 90 UG/1
2 AEROSOL, METERED RESPIRATORY (INHALATION) EVERY 6 HOURS PRN
COMMUNITY
Start: 2024-02-28

## 2024-03-07 NOTE — TELEPHONE ENCOUNTER
Procedure: Colon   Date: May 13th  Physician performing: Dr. Rucker  Location of procedure:  [place]  Instructions given to patient: Miralax  Diabetic: n/a  Clearances: n/a

## 2024-03-07 NOTE — Clinical Note
Hi Dr. Rucker!    Saw your UC patient today.  Continues to do very well on Stelara q4wk.  Ordered his next labs, colonoscopy, and MRE.  He mentioned he was recently diagnosed w/ advanced bilateral cataracts and will be getting surgery.  He questioned whether Stelara could have caused that.  I did not see anything in the literature but I wanted to send the question your way.  Please let me know your thoughts and I'll be happy to touch base w/ patient.  Thanks!! Lucille

## 2024-03-07 NOTE — PROGRESS NOTES
[facility] - Outpatient Follow-up Note  Marlon TOURE Senavaitis 53 y.o. male MRN: [id number]  Encounter: [id number]          ASSESSMENT AND PLAN:    1.  Ulcerative colitis  2.  Immunocompromise state  3.  Vitamin D deficiency  53-year-old male with history of ulcerative colitis, previously on Humira, Entyvio, Xeljanz, now currently on Stelara every 4 weeks, last seen in March 2023 who presents for follow-up.  Patient is feeling very well and is happy with Stelara, reports it has been life-changing for him in a positive way.  He reports within the last year, he was diagnosed with bilateral cataracts.  Patient also reports he is concerned he may have ROMAN and would like to see a specialist.  Patient has noticed he is more prone to URI's, stays up to date with all vaccines.  Saw dermatologist once for a skin check but is not on an annual schedule.  He has 8 formed BMs daily, mostly in the morning, with some associated urgency.  Denies nocturnal BMs, incontinence, abdominal pain, black/bloody BMs, rectal bleeding, rashes, mouth sores, joint pains, heartburn, dysphagia, odynophagia, nausea, vomiting, weight loss.  Last colonoscopy 5/2023 with mild patchy erythematous edematous and granular mucosa with erosion in the descending, sigmoid, and rectosigmoid colon and rectum.  10+ inflammatory polyps, status post partial removal.  Pathology consistent benign colonic mucosa with mild to moderate chronic active colitis with some surface erosions and 1 inflammatory polyp.       -Continue Stelara every 4 weeks  -Next blood work prior to the next injection.  Check CBC, CMP, CRP, vitamin D, vitamin B12, iron studies, quant gold, hepatitis panel  -Next colonoscopy due now  -Next MRE due now  -Gave patient recommendations for sleep-medicine pulmonology physicians within [facility] to work up for possible ROMAN.     -Avoid live virus vaccines  -Yearly flu shot  -COVID vaccine and booster  -Pneumonia  vaccine  -Shingrix  -Routine skin exams with the dermatologist - provided referral.      Follow up 6 months with Dr. Rucker.    __________________________________________________________    SUBJECTIVE:  Marlon Bay is a very pleasant 53-year-old male with history of ulcerative colitis, previously on Humira, Entyvio, Xeljanz, now currently on Stelara every 4 weeks, last seen in March 2023 who presents for follow-up.    Patient is feeling very well and is happy with Stelara, reports it has been life-changing for him in a positive way.  He reports within the last year, he was diagnosed with bilateral cataracts.  Patient also reports he is concerned he may have ROMAN and would like to see a specialist.  Patient has noticed he is more prone to URI's, stays up to date with all vaccines.  Saw dermatologist once for a skin check but is not on an annual schedule.    8 BMs per day, mostly in the morning, and patient reports this is predictable and bowel habits are well controlled compared to previous bowel habits. Stools are formed.  + urgency, which worsens with stress, but no nocturnal BMs, incontinence  No bright red blood per rectum/rectal bleeding, melena  No abdominal pain  No rashes, mouth sores, joint pains  No heartburn, dysphagia, odynophagia, nausea, vomiting  No weight loss.       Last colonoscopy 5/1/2023 with mild patchy erythematous edematous and granular mucosa with erosion in the descending, sigmoid, and rectosigmoid colon and rectum.  10+ inflammatory polyps, status post partial removal.  Pathology consistent benign colonic mucosa with mild to moderate chronic active colitis with some surface erosions and 1 inflammatory polyp.  Recommended for repeat in 1 year.    MRE from August 2019 with inflammation in the rectum and sigmoid but no evidence of fibrostenotic disease.     Prior HIDA scan with CCK normal.     Last Stelara level 0.6 with no antidrug antibodies back in November 2021. Prior blood work  from 2021 with normal CMP, low vitamin D at 14.9, B12 at 491, CBC with normal white blood cell count but hemoglobin 11.4, MCV 83, normal platelets.  CRP 12.6.  Quant gold negative at that time.     Last EGD 2019 normal other than 1 fundic gland polyp    REVIEW OF SYSTEMS:  10 point ROS reviewed and negative, except as above      Historical Information   Past Medical History:   Diagnosis Date    Anxiety     Colon polyp     Pneumonia     Ulcerative colitis (HCC)     Vertigo      Past Surgical History:   Procedure Laterality Date    COLONOSCOPY N/A 2016    Procedure: COLONOSCOPY;  Surgeon: Karl Coyle MD;  Location: [facility];  Service:     EGD AND COLONOSCOPY N/A 2017    Procedure: EGD AND COLONOSCOPY;  Surgeon: Karl Coyle MD;  Location:  GI LAB;  Service: Gastroenterology    HERNIA REPAIR      as infant    NASAL SEPTUM SURGERY      SD COLONOSCOPY FLX DX W/COLLJ SPEC WHEN PFRMD N/A 3/29/2019    Procedure: COLONOSCOPY;  Surgeon: Karl Coyle MD;  Location: [facility];  Service: Gastroenterology     Social History   Social History     Substance and Sexual Activity   Alcohol Use Yes    Comment: occationally     Social History     Substance and Sexual Activity   Drug Use No     Social History     Tobacco Use   Smoking Status Former    Current packs/day: 0.00    Types: Cigarettes    Quit date: 3/29/2004    Years since quittin.9   Smokeless Tobacco Never     Family History   Problem Relation Age of Onset    Hypertension Father     Cancer Maternal Grandfather     Diabetes Paternal Grandmother     Hypertension Paternal Grandmother     Cancer Paternal Grandfather     Hypertension Paternal Grandfather        Meds/Allergies       Current Outpatient Medications:     Loperamide HCl (IMODIUM PO)    sertraline (ZOLOFT) 50 mg tablet    Stelara 90 MG/ML subcutaneous injection    No Known Allergies        Objective     Wt Readings from Last 3 Encounters:   23 122 kg (270 lb)   23 124  kg (273 lb)   02/10/22 128 kg (283 lb)     Temp Readings from Last 3 Encounters:   05/01/23 97.5 °F (36.4 °C) (Temporal)   03/09/23 98.5 °F (36.9 °C) (Tympanic)   02/10/22 (!) 96.7 °F (35.9 °C) (Temporal)     BP Readings from Last 3 Encounters:   05/01/23 126/70   03/09/23 120/80   12/30/21 127/80     Pulse Readings from Last 3 Encounters:   05/01/23 89   03/09/23 77   12/30/21 89          PHYSICAL EXAM:      Physical Exam  Vitals reviewed.   Constitutional:       General: He is not in acute distress.     Appearance: He is well-developed.   HENT:      Head: Normocephalic and atraumatic.   Eyes:      Conjunctiva/sclera: Conjunctivae normal.   Cardiovascular:      Rate and Rhythm: Normal rate and regular rhythm.      Heart sounds: No murmur heard.  Pulmonary:      Effort: Pulmonary effort is normal. No respiratory distress.      Breath sounds: Normal breath sounds.   Abdominal:      General: Bowel sounds are normal. There is no distension.      Palpations: Abdomen is soft.      Tenderness: There is no abdominal tenderness.   Musculoskeletal:         General: No swelling.      Cervical back: Neck supple.   Skin:     General: Skin is warm and dry.   Neurological:      Mental Status: He is alert.   Psychiatric:         Mood and Affect: Mood normal.        Lab Results:   No visits with results within 1 Day(s) from this visit.   Latest known visit with results is:   Hospital Outpatient Visit on 05/01/2023   Component Date Value    Case Report 05/01/2023                      Value:Surgical Pathology Report                         Case: [id number]                                   Authorizing Provider:  Selvin Rucker MD    Collected:           05/01/2023 1315              Ordering Location:     [facility]        Received:            05/01/2023 [address] [place] [address] Endoscopy                                                     Pathologist:           Gene Izquierdo MD                                                                  Specimens:   A) - Colon, colon bx at 90 cm  hx colitis                                                           B) - Colon, colon bx at 80 cm  hx colitis                                                           C) - Colon, colon bx at 70cm  hx colitis                                                            E) - Colon, colon bx at 60cm  hx colitis                                                            G) - Colon, colon bx at 50cm  hx colitis                                                                                      H) - Colon, colon bx at 40cm  hx colitis                                                            I) - Colon, colon bx at 30cm  hx colitis                                                            J) - Colon, colon bx at 20cm  hx colitis                                                            K) - Colon, colon bx at 10cm  hx colitis                                                            L) - Polyp, Colorectal, recto-sigmoid colon polyps  hx colitis                             Addendum 05/01/2023                      Value:This result contains rich text formatting which cannot be displayed here.    Final Diagnosis 05/01/2023                      Value:This result contains rich text formatting which cannot be displayed here.    Additional Information 05/01/2023                      Value:This result contains rich text formatting which cannot be displayed here.    Synoptic Checklist 05/01/2023                      Value:                            COLON/RECTUM POLYP FORM - GI - All Specimens                                                                                     :    Other      Gross Description 05/01/2023                      Value:This result contains rich text formatting which cannot be displayed here.       Lab Results   Component Value Date    WBC 7.05 11/24/2021    HGB 11.4 (L) 11/24/2021    HCT 38.1 11/24/2021     MCV 83 11/24/2021     11/24/2021       Lab Results   Component Value Date    SODIUM 137 11/24/2021    K 4.4 11/24/2021     11/24/2021    CO2 25 11/24/2021    AGAP 7 11/24/2021    BUN 14 11/24/2021    CREATININE 0.90 11/24/2021    GLUC 103 11/24/2021    CALCIUM 9.8 11/24/2021    AST 14 11/24/2021    ALT 21 11/24/2021    ALKPHOS 71 11/24/2021    TP 7.7 11/24/2021    TBILI 0.27 11/24/2021    EGFR 99 11/24/2021         Radiology Results:   No results found.

## 2024-03-08 ENCOUNTER — TELEPHONE (OUTPATIENT)
Dept: GASTROENTEROLOGY | Facility: CLINIC | Age: 54
End: 2024-03-08

## 2024-03-08 NOTE — TELEPHONE ENCOUNTER
Okay, those were resent Pre op instructions reviewed with patient per phone on 3/08/24: Spoke about pre op process and surgery instructions, verbalized understanding.    Surgery is scheduled on 3/14/24.  We will call you the business day prior to surgery to confirm arrival time (after 2:30 pm), as it is subject to change due to cancellations & emergencies.    Please report to the [facility] (1st Floor) at [facility] located off of [facility] (2nd building on the left, in front of the [facility] pole),address: [address] [place] [address] [place] [address]      Blood Thinners: Please do not take your Aspirin and Cilostazol on the day of surgery    INSTRUCTIONS IMPORTANT!!!  Do Not Eat, Drink, or Smoke after 12 midnight! NO WATER after midnight! OK to brush teeth, no gum, candy or mints!    Take only these medicines with a small swallow of water-morning of surgery:  Inhalers  Amlodipine  Carvedilol  Fluoxetine, (Prozac)    *Additional Medication Instructions: DO NOT TAKE YOUR TOUJEO INSULIN THE EVENING PRIOR TO SURGERY.    ____  NO Acrylic/fake nails or nail polish worn day of surgery (specifically hand/arm & foot surgeries).  ____  NO powder, lotions, deodorants, oils or creams on body.  ____  Please Remove All jewelry & piercings prior to surgery.  ____  Please Remove Dentures, Hearing Aids & Contact Lens prior to the start of surgery.  ____  Please bring photo ID and insurance information to hospital (Leave Valuables at Home).  ____  If going home the same day, arrange for a ride home. You will not be able to drive 24 hrs if Anesthesia was used.   ____  Wear clean, loose fitting clothing. Allow for dressings, bandages.  ____  Stop all Aspirin products, Ibuprofen, Advil, Motrin & Aleve at least 5-7 days before surgery, unless otherwise instructed by your doctor, or the nurse.   ____  Blood Thinners are stopped based on your Provider's recommendation; Call Surgeon's Office to inquire when to stop/hold.  ____  Stop taking any Fish Oil  supplements or Vitamins at least 5 days prior to surgery, unless instructed otherwise by your Doctor.           Diabetic Patients: If you take diabetic medication, do NOT take morning of surgery unless instructed by             Doctor. Metformin to be stopped 24 hrs prior to surgery time. DO NOT take long-acting insulin the evening before surgery. Blood sugars will be checked in pre-op morning of.    Bathing Instructions:    -Do not shave your face or body the day before or the day of surgery.              -Shower & Rinse your body as usual with anti-bacterial Soap (Dial), on the evening prior to surgery and the morning of surgery.               -Rinse your body thoroughly.                -Pat yourself day with a clean, soft towel.               -Do not use lotion, cream, powder or deodorant               -Dress in clean clothes     [facility] Visitor/Ride Policy:  Only 2 adults allowed (over the age of 18) to accompany you to the Hospital. You Must have a ride home from a responsible adult that you know and trust. Medical Transport, Uber or Lyft can only be used if patient has a responsible adult to accompany them during ride home.    Post-Op Instructions: You will receive Post-op/Discharge instructions by your Discharge Nurse prior to going home. Please call your Surgeon's office with any post-surgery questions/concerns.    *Call [facility] Pre-Admissions Department with surgery instruction questions @ [phone] -Ana  or [phone]  (Mon-Fri 8 am to 4 pm)    *If you are running late or have questions the morning of surgery, please call the Surgery Dept @ [phone]  *Insurance/ Financial Questions, please call [phone].

## 2024-03-08 NOTE — TELEPHONE ENCOUNTER
Incoming Call from [facility]. Requesting clarification if Stelara is to be every 4 weeks or every 8 weeks. [place] ref [id number].

## 2024-03-18 ENCOUNTER — TELEPHONE (OUTPATIENT)
Dept: GASTROENTEROLOGY | Facility: CLINIC | Age: 54
End: 2024-03-18

## 2024-03-18 ENCOUNTER — TELEPHONE (OUTPATIENT)
Age: 54
End: 2024-03-18

## 2024-03-18 DIAGNOSIS — K51.919 ULCERATIVE COLITIS WITH COMPLICATION, UNSPECIFIED LOCATION (HCC): ICD-10-CM

## 2024-03-18 RX ORDER — USTEKINUMAB 90 MG/ML
INJECTION, SOLUTION SUBCUTANEOUS
Qty: 1 ML | Refills: 1 | Status: SHIPPED | OUTPATIENT
Start: 2024-03-18

## 2024-03-18 NOTE — TELEPHONE ENCOUNTER
Patient's spouse phoned in and said that they received a letter dated 3/4 from [facility] denying the patient's Stelara. I looked into the documentation that we have received and there is a letter stating it's approval on 3/8/2024 for Stelara q 56 days. Also documentation on 3/11 of the approval.

## 2024-04-29 ENCOUNTER — ANESTHESIA (OUTPATIENT)
Dept: ANESTHESIOLOGY | Facility: HOSPITAL | Age: 54
End: 2024-04-29

## 2024-04-29 ENCOUNTER — ANESTHESIA EVENT (OUTPATIENT)
Dept: ANESTHESIOLOGY | Facility: HOSPITAL | Age: 54
End: 2024-04-29

## 2024-05-10 RX ORDER — SODIUM CHLORIDE 9 MG/ML
125 INJECTION, SOLUTION INTRAVENOUS CONTINUOUS
Status: CANCELLED | OUTPATIENT
Start: 2024-05-10

## 2024-05-13 ENCOUNTER — ANESTHESIA EVENT (OUTPATIENT)
Dept: GASTROENTEROLOGY | Facility: MEDICAL CENTER | Age: 54
End: 2024-05-13

## 2024-05-13 ENCOUNTER — HOSPITAL ENCOUNTER (OUTPATIENT)
Dept: GASTROENTEROLOGY | Facility: MEDICAL CENTER | Age: 54
Setting detail: OUTPATIENT SURGERY
Discharge: HOME | End: 2024-05-13
Payer: COMMERCIAL

## 2024-05-13 ENCOUNTER — ANESTHESIA (OUTPATIENT)
Dept: GASTROENTEROLOGY | Facility: MEDICAL CENTER | Age: 54
End: 2024-05-13

## 2024-05-13 VITALS
HEART RATE: 75 BPM | BODY MASS INDEX: 39.31 KG/M2 | WEIGHT: 274 LBS | RESPIRATION RATE: 18 BRPM | DIASTOLIC BLOOD PRESSURE: 64 MMHG | SYSTOLIC BLOOD PRESSURE: 116 MMHG | OXYGEN SATURATION: 95 % | TEMPERATURE: 97.5 F

## 2024-05-13 DIAGNOSIS — D84.9 IMMUNOCOMPROMISED STATE (HCC): ICD-10-CM

## 2024-05-13 DIAGNOSIS — K51.919 ULCERATIVE COLITIS WITH COMPLICATION, UNSPECIFIED LOCATION (HCC): ICD-10-CM

## 2024-05-13 PROCEDURE — 45380 COLONOSCOPY AND BIOPSY: CPT | Performed by: INTERNAL MEDICINE

## 2024-05-13 PROCEDURE — 88305 TISSUE EXAM BY PATHOLOGIST: CPT | Performed by: PATHOLOGY

## 2024-05-13 RX ORDER — PROPOFOL 10 MG/ML
INJECTION, EMULSION INTRAVENOUS AS NEEDED
Status: DISCONTINUED | OUTPATIENT
Start: 2024-05-13 | End: 2024-05-13

## 2024-05-13 RX ORDER — SERTRALINE HYDROCHLORIDE 100 MG/1
100 TABLET, FILM COATED ORAL DAILY
COMMUNITY
Start: 2024-05-03

## 2024-05-13 RX ORDER — SODIUM CHLORIDE 9 MG/ML
125 INJECTION, SOLUTION INTRAVENOUS CONTINUOUS
Status: DISCONTINUED | OUTPATIENT
Start: 2024-05-13 | End: 2024-05-17 | Stop reason: HOSPADM

## 2024-05-13 RX ADMIN — PROPOFOL 100 MG: 10 INJECTION, EMULSION INTRAVENOUS at 11:41

## 2024-05-13 RX ADMIN — PROPOFOL 100 MG: 10 INJECTION, EMULSION INTRAVENOUS at 11:37

## 2024-05-13 RX ADMIN — PROPOFOL 100 MG: 10 INJECTION, EMULSION INTRAVENOUS at 11:28

## 2024-05-13 RX ADMIN — PROPOFOL 100 MG: 10 INJECTION, EMULSION INTRAVENOUS at 11:30

## 2024-05-13 RX ADMIN — PROPOFOL 150 MG: 10 INJECTION, EMULSION INTRAVENOUS at 11:24

## 2024-05-13 RX ADMIN — PROPOFOL 100 MG: 10 INJECTION, EMULSION INTRAVENOUS at 11:33

## 2024-05-13 RX ADMIN — SODIUM CHLORIDE 125 ML/HR: 0.9 INJECTION, SOLUTION INTRAVENOUS at 10:58

## 2024-05-13 NOTE — H&P
History and Physical - [facility]  Marlon Bay 54 y.o. male MRN: [id number]                  HPI: Marlon Bay is a 54 y.o. year old male who presents for UC      REVIEW OF SYSTEMS: Per the HPI, and otherwise unremarkable.    Historical Information   Past Medical History:   Diagnosis Date    Anxiety     Colon polyp     Pneumonia     Ulcerative colitis (HCC)     Vertigo      Past Surgical History:   Procedure Laterality Date    COLONOSCOPY N/A 2016    Procedure: COLONOSCOPY;  Surgeon: Karl Coyle MD;  Location: [facility];  Service:     EGD AND COLONOSCOPY N/A 2017    Procedure: EGD AND COLONOSCOPY;  Surgeon: Karl Coyle MD;  Location:  GI LAB;  Service: Gastroenterology    HERNIA REPAIR      as infant    NASAL SEPTUM SURGERY      VT COLONOSCOPY FLX DX W/COLLJ SPEC WHEN PFRMD N/A 3/29/2019    Procedure: COLONOSCOPY;  Surgeon: Karl Coyle MD;  Location: [facility];  Service: Gastroenterology     Social History   Social History     Substance and Sexual Activity   Alcohol Use Yes    Comment: occationally     Social History     Substance and Sexual Activity   Drug Use No     Social History     Tobacco Use   Smoking Status Former    Current packs/day: 0.00    Types: Cigarettes    Quit date: 3/29/2004    Years since quittin.1   Smokeless Tobacco Never     Family History   Problem Relation Age of Onset    Hypertension Father     Cancer Maternal Grandfather     Diabetes Paternal Grandmother     Hypertension Paternal Grandmother     Cancer Paternal Grandfather     Hypertension Paternal Grandfather        Meds/Allergies     (Not in a hospital admission)      No Known Allergies    Objective     There were no vitals taken for this visit.      PHYSICAL EXAMINATION:    General Appearance:   Alert, cooperative, no distress   HEENT:  Normocephalic, atraumatic, anicteric. Neck supple, symmetrical, trachea midline.   Lungs:   Equal chest rise and unlabored breathing, normal  effort, no coughing.   Cardiovascular:   No visualized JVD.   Abdomen:   No abdominal distension.   Skin:   No jaundice, rashes, or lesions.    Musculoskeletal:   Normal range of motion visualized.   Psych:  Normal affect and normal insight.   Neuro:  Alert and appropriate.           ASSESSMENT/PLAN:  This is a 54 y.o. year old male here for colonoscopy, and he is stable and optimized for his procedure.

## 2024-05-13 NOTE — ANESTHESIA PREPROCEDURE EVALUATION
Procedure:  COLONOSCOPY    Relevant Problems   NEURO/PSYCH   (+) Anxiety about health        Physical Exam    Airway    Mallampati score: III         Dental       Cardiovascular  Rhythm: regular, Rate: normal    Pulmonary   Breath sounds clear to auscultation    Other Findings        Anesthesia Plan  ASA Score- 2     Anesthesia Type- IV sedation with anesthesia with ASA Monitors.         Additional Monitors:     Airway Plan:            Plan Factors-Exercise tolerance (METS): >4 METS.    Chart reviewed.   Existing labs reviewed. Patient summary reviewed.    Patient is not a current smoker.              Induction- intravenous.    Postoperative Plan-     Informed Consent- Anesthetic plan and risks discussed with patient.

## 2024-05-13 NOTE — ANESTHESIA POSTPROCEDURE EVALUATION
Post-Op Assessment Note    CV Status:  Stable  Pain Score: 1    Pain management: adequate       Mental Status:  Alert and awake   Hydration Status:  Euvolemic   PONV Controlled:  Controlled   Airway Patency:  Patent     Post Op Vitals Reviewed: Yes    No anethesia notable event occurred.    Staff: Anesthesiologist               /62 (05/13/24 1152)    Temp      Pulse 84 (05/13/24 1152)   Resp 16 (05/13/24 1152)    SpO2 96 % (05/13/24 1152)

## 2024-05-15 PROCEDURE — 88305 TISSUE EXAM BY PATHOLOGIST: CPT | Performed by: PATHOLOGY

## 2024-06-10 ENCOUNTER — TELEPHONE (OUTPATIENT)
Age: 54
End: 2024-06-10

## 2024-06-10 ENCOUNTER — TELEPHONE (OUTPATIENT)
Dept: GASTROENTEROLOGY | Facility: CLINIC | Age: 54
End: 2024-06-10

## 2024-06-10 NOTE — TELEPHONE ENCOUNTER
Rose called in and reported that the patient is in need of Stelara samples. I agreed to order some and will reach out when they come in. She was thankful.  Patient has 1 pfs on hand.

## 2024-06-14 NOTE — TELEPHONE ENCOUNTER
Samples arrived today. Connected with the patient's wife Rose and said that his samples are ready for  at the [facility] office location. The patient will stop by on Monday.

## 2024-07-15 ENCOUNTER — DOCUMENTATION (OUTPATIENT)
Dept: GASTROENTEROLOGY | Facility: CLINIC | Age: 54
End: 2024-07-15

## 2024-07-15 NOTE — PROGRESS NOTES
Pt came into the office. Pt confirmed name and date of birth. Provided 3 samples of Stelara 90mg/ml single prefilled syringes.

## 2024-07-22 DIAGNOSIS — K51.919 ULCERATIVE COLITIS WITH COMPLICATION, UNSPECIFIED LOCATION (HCC): ICD-10-CM

## 2024-07-22 RX ORDER — USTEKINUMAB 90 MG/ML
INJECTION, SOLUTION SUBCUTANEOUS
Qty: 1 ML | Refills: 1 | Status: SHIPPED | OUTPATIENT
Start: 2024-07-22

## 2024-08-26 NOTE — PLAN OF CARE
Problem: Potential for Falls  Goal: Patient will remain free of falls  INTERVENTIONS:  - Assess patient frequently for physical needs  -  Identify cognitive and physical deficits and behaviors that affect risk of falls    -  [place] fall precautions as indicated by assessment   - Educate patient/family on patient safety including physical limitations  - Instruct patient to call for assistance with activity based on assessment  - Modify environment to reduce risk of injury  - Consider OT/PT consult to assist with strengthening/mobility   Outcome: Progressing No FACES

## 2024-09-13 NOTE — TELEPHONE ENCOUNTER
Please ask patient if he is able to wait for [facility] to get his cort[facility]am [facility] or if he would like me to send to a different pharmacy   Thank you What Type Of Note Output Would You Prefer (Optional)?: Standard Output Hpi Title: Evaluation of Skin Lesions How Severe Are Your Spot(S)?: severe Have Your Spot(S) Been Treated In The Past?: has not been treated

## 2024-11-12 DIAGNOSIS — K51.919 ULCERATIVE COLITIS WITH COMPLICATION, UNSPECIFIED LOCATION (HCC): ICD-10-CM

## 2024-11-12 RX ORDER — USTEKINUMAB 90 MG/ML
INJECTION, SOLUTION SUBCUTANEOUS
Qty: 1 ML | Refills: 1 | Status: SHIPPED | OUTPATIENT
Start: 2024-11-12

## 2024-12-16 ENCOUNTER — TELEPHONE (OUTPATIENT)
Dept: GASTROENTEROLOGY | Facility: CLINIC | Age: 54
End: 2024-12-16

## 2024-12-16 DIAGNOSIS — K51.919 ULCERATIVE COLITIS WITH COMPLICATION, UNSPECIFIED LOCATION (HCC): ICD-10-CM

## 2024-12-16 NOTE — TELEPHONE ENCOUNTER
Patient is in need of annual blood work for TB and Hep B. Requested these to be completed. Also will proceed with authorization for current dosing. If denied again than will move to patient assistance. Patient's wife Hannah made aware of this and that forms will come through the patient's myChart to fill out in the near future once denial is determined.

## 2024-12-20 ENCOUNTER — APPOINTMENT (OUTPATIENT)
Dept: LAB | Facility: CLINIC | Age: 54
End: 2024-12-20
Payer: COMMERCIAL

## 2024-12-20 DIAGNOSIS — D84.9 IMMUNOCOMPROMISED STATE (HCC): ICD-10-CM

## 2024-12-20 DIAGNOSIS — K51.919 ULCERATIVE COLITIS WITH COMPLICATION, UNSPECIFIED LOCATION (HCC): ICD-10-CM

## 2024-12-20 LAB
ERYTHROCYTE [SEDIMENTATION RATE] IN BLOOD: 25 MM/HOUR (ref 0–19)
FERRITIN SERPL-MCNC: 20 NG/ML (ref 24–336)
FOLATE SERPL-MCNC: 15.5 NG/ML
IRON SATN MFR SERPL: 29 % (ref 15–50)
IRON SERPL-MCNC: 111 UG/DL (ref 50–212)
TIBC SERPL-MCNC: 387.8 UG/DL (ref 250–450)
TRANSFERRIN SERPL-MCNC: 277 MG/DL (ref 203–362)
UIBC SERPL-MCNC: 277 UG/DL (ref 155–355)

## 2024-12-20 PROCEDURE — 86480 TB TEST CELL IMMUN MEASURE: CPT

## 2024-12-20 PROCEDURE — 83550 IRON BINDING TEST: CPT

## 2024-12-20 PROCEDURE — 85652 RBC SED RATE AUTOMATED: CPT

## 2024-12-20 PROCEDURE — 87340 HEPATITIS B SURFACE AG IA: CPT

## 2024-12-20 PROCEDURE — 36415 COLL VENOUS BLD VENIPUNCTURE: CPT

## 2024-12-20 PROCEDURE — 82397 CHEMILUMINESCENT ASSAY: CPT

## 2024-12-20 PROCEDURE — 83540 ASSAY OF IRON: CPT

## 2024-12-20 PROCEDURE — 80299 QUANTITATIVE ASSAY DRUG: CPT

## 2024-12-20 PROCEDURE — 82746 ASSAY OF FOLIC ACID SERUM: CPT

## 2024-12-20 PROCEDURE — 82728 ASSAY OF FERRITIN: CPT

## 2024-12-21 LAB
GAMMA INTERFERON BACKGROUND BLD IA-ACNC: 0 IU/ML
HBV SURFACE AG SER QL: NORMAL
M TB IFN-G BLD-IMP: NEGATIVE
M TB IFN-G CD4+ BCKGRND COR BLD-ACNC: 0 IU/ML
M TB IFN-G CD4+ BCKGRND COR BLD-ACNC: 0.01 IU/ML
MITOGEN IGNF BCKGRD COR BLD-ACNC: 4.7 IU/ML

## 2024-12-23 ENCOUNTER — RESULTS FOLLOW-UP (OUTPATIENT)
Age: 54
End: 2024-12-23

## 2024-12-26 RX ORDER — USTEKINUMAB 90 MG/ML
90 INJECTION, SOLUTION SUBCUTANEOUS
Qty: 1 ML | Refills: 11 | Status: SHIPPED | OUTPATIENT
Start: 2024-12-26

## 2024-12-26 NOTE — TELEPHONE ENCOUNTER
Medication:  STELARA 90MG  Directions:  Q 28 DAYS  Quantity:1   Day Supply: 1  Insurance: [employer]  How Prior Auth was submitted: called [facility]  Authorization Date range: 12/23/24-12/23/25  Authorization Number: Letter in Media  Pharmacy that fills med:[facility]  Patient aware of approval: yes  Patient aware of pharmacy information: yes

## 2024-12-26 NOTE — TELEPHONE ENCOUNTER
Received call back and relayed the approval of Kerri valentin 28 days to the wife. She and I are happy with the outcome.

## 2024-12-26 NOTE — TELEPHONE ENCOUNTER
12/26 please send new RX for pts stelara to [facility]    Stelara 90mg inject 1 subcu q 28 days #1 11 refills    Thank you

## 2025-01-13 LAB
USTEKINUMAB AB SERPL IA-MCNC: <40 NG/ML
USTEKINUMAB SERPL IA-MCNC: 7 UG/ML

## 2025-02-03 ENCOUNTER — OFFICE VISIT (OUTPATIENT)
Age: 55
End: 2025-02-03
Payer: COMMERCIAL

## 2025-02-03 ENCOUNTER — TELEPHONE (OUTPATIENT)
Age: 55
End: 2025-02-03

## 2025-02-03 VITALS
DIASTOLIC BLOOD PRESSURE: 82 MMHG | WEIGHT: 272.4 LBS | HEIGHT: 70 IN | OXYGEN SATURATION: 95 % | TEMPERATURE: 97.4 F | SYSTOLIC BLOOD PRESSURE: 140 MMHG | BODY MASS INDEX: 39 KG/M2 | HEART RATE: 99 BPM

## 2025-02-03 DIAGNOSIS — K51.919 ULCERATIVE COLITIS WITH COMPLICATION, UNSPECIFIED LOCATION (HCC): Primary | ICD-10-CM

## 2025-02-03 DIAGNOSIS — D84.9 IMMUNOCOMPROMISED STATE (HCC): ICD-10-CM

## 2025-02-03 PROCEDURE — 99214 OFFICE O/P EST MOD 30 MIN: CPT | Performed by: DIETITIAN, REGISTERED

## 2025-02-03 RX ORDER — DIPHENOXYLATE HYDROCHLORIDE AND ATROPINE SULFATE 2.5; .025 MG/1; MG/1
1 TABLET ORAL DAILY
COMMUNITY

## 2025-02-03 RX ORDER — SODIUM CHLORIDE, SODIUM LACTATE, POTASSIUM CHLORIDE, CALCIUM CHLORIDE 600; 310; 30; 20 MG/100ML; MG/100ML; MG/100ML; MG/100ML
125 INJECTION, SOLUTION INTRAVENOUS CONTINUOUS
OUTPATIENT
Start: 2025-02-03

## 2025-02-03 NOTE — TELEPHONE ENCOUNTER
Procedure: Colonoscopy  Date: 05/05/2025  Physician performing: Dr. Jorge  Location of procedure:  [facility]  Instructions given to patient: Miralax  Diabetic: no  Clearances: n/a

## 2025-02-03 NOTE — PROGRESS NOTES
Name: Marlon Bay      : 1970      MRN: [id number]  Encounter Provider: Jonah Mcconnell PA-C  Encounter Date: 2/3/2025   Encounter department: [facility]  :  Assessment & Plan  Ulcerative colitis with complication, unspecified location (HCC)  54-year-old male with history of ulcerative colitis, previously on Humira, Entyvio, Xeljanz, now currently on Stelara every 4 weeks, last seen in the office in 2024 who presents for follow-up.  Last colonoscopy 2024, which was normal from the cecum to the sigmoid colon.  There was mild generalized erythematous mucosa and multiple inflammatory pseudopolyps in the rectosigmoid and rectum.  Colon biopsies overall normal, with some chronic mild active colitis with crypt architectural distortion/branching in the rectum and rectosigmoid, as well as focally in the cecum.  Ustekinumab drug level 7, with no detectable antibodies in 2024.  Hepatitis B and quant gold negative in 2024.  Patient states he is feeling well with bowel function at baseline and no fecal urgency, abdominal pain, blood in the stool, abnormal weight loss.  Orders:  •  Colonoscopy; Future  •  CBC; Future  •  Comprehensive metabolic panel; Future  •  C-reactive protein; Future  •  Vitamin B12; Future  •  Vitamin D 25 hydroxy; Future  •  Ambulatory Referral to Dermatology; Future  -Continue ustekinumab every 4 weeks.  -Schedule repeat colonoscopy ~May 2025, with MiraLAX/Dulcolax bowel prep.  -Check updated CBC, CMP, CRP, vitamin B12, vitamin D.  -Recommend Mediterranean diet as anti-inflammatory diet as well as for weight management.  -Follow-up in the office in 6 months.  Immunocompromised state (HCC)  -Avoid live virus vaccines  -Yearly flu shot  -COVID vaccine and booster  -Pneumonia vaccine  -Shingrix  -Recommend routine skin exams with the dermatologist - provided referral.      History of Present Illness {?Quick Links Encounters * My  "Last Note * Last Note in Specialty * Snapshot * Since Last Visit * History :36636}  HPI  Marlon Bay is a 54 y.o. male who presents for follow-up of ulcerative colitis.  Last office visit 3/7/2024.    Patient reports he is feeling very well with  normal bowel function and no recent increase in symptoms.  At baseline he has 8-10 BMs per day, but overall these are formed and normal.  Sometimes stools are loose, which he thinks may be due to dietary choices.  He denies any blood in the stool, fecal urgency, nocturnal bowel movements, incontinence, abdominal pain, heartburn, nausea, vomiting.  He denies any rashes, mouth sores, joint pains.  He has been trying to exercise more regularly and is trying to lose weight.  He has lost about 7 pounds so far.  He would like to lose about 35 more pounds.    Last colonoscopy 5/13/2024, which was normal from the cecum to the sigmoid colon.  There was mild generalized erythematous mucosa and multiple inflammatory pseudopolyps in the rectosigmoid and rectum.  Colon biopsies overall normal, with some chronic mild active colitis with crypt architectural distortion/branching in the rectum and rectosigmoid, as well as focally in the cecum.    Ustekinumab drug level 7, with no detectable antibodies in 12/2024.  Hepatitis B and quant gold negative in 12/2024.      Review of Systems   All other systems reviewed and are negative.         Objective {?Quick Links Trend Vitals * Enter New Vitals * Results Review * Timeline (Adult) * Labs * Imaging * Cardiology * Procedures * Lung Cancer Screening * Surgical eConsent :43355}  /82 (BP Location: Left arm, Patient Position: Sitting, Cuff Size: Large)   Pulse 99   Temp (!) 97.4 °F (36.3 °C) (Tympanic)   Ht 5' 10\" (1.778 m)   Wt 124 kg (272 lb 6.4 oz)   SpO2 95%   BMI 39.09 kg/m²      Physical Exam  Vitals reviewed.   HENT:      Head: Normocephalic and atraumatic.   Eyes:      Conjunctiva/sclera: Conjunctivae normal.   Pulmonary: "      Effort: Pulmonary effort is normal.   Skin:     Coloration: Skin is not jaundiced or pale.   Neurological:      General: No focal deficit present.   Psychiatric:         Mood and Affect: Mood normal.         Behavior: Behavior normal.

## 2025-02-03 NOTE — ASSESSMENT & PLAN NOTE
54-year-old male with history of ulcerative colitis, previously on Humira, Entyvio, Xeljanz, now currently on Stelara every 4 weeks, last seen in the office in March 2024 who presents for follow-up.  Last colonoscopy 5/13/2024, which was normal from the cecum to the sigmoid colon.  There was mild generalized erythematous mucosa and multiple inflammatory pseudopolyps in the rectosigmoid and rectum.  Colon biopsies overall normal, with some chronic mild active colitis with crypt architectural distortion/branching in the rectum and rectosigmoid, as well as focally in the cecum.  Ustekinumab drug level 7, with no detectable antibodies in 12/2024.  Hepatitis B and quant gold negative in 12/2024.  Patient states he is feeling well with bowel function at baseline and no fecal urgency, abdominal pain, blood in the stool, abnormal weight loss.  Orders:  •  Colonoscopy; Future  •  CBC; Future  •  Comprehensive metabolic panel; Future  •  C-reactive protein; Future  •  Vitamin B12; Future  •  Vitamin D 25 hydroxy; Future  •  Ambulatory Referral to Dermatology; Future  -Continue ustekinumab every 4 weeks.  -Schedule repeat colonoscopy ~May 2025, with MiraLAX/Dulcolax bowel prep.  -Check updated CBC, CMP, CRP, vitamin B12, vitamin D.  -Recommend Mediterranean diet as anti-inflammatory diet as well as for weight management.  -Follow-up in the office in 6 months.

## 2025-02-07 ENCOUNTER — TELEPHONE (OUTPATIENT)
Age: 55
End: 2025-02-07

## 2025-02-07 NOTE — TELEPHONE ENCOUNTER
I called the patient wife, wife aware communication consent out of date, wife requesting our office call the patient on work phone.     I called the patients work phone, reached , left message to call back to review medication questions.

## 2025-02-07 NOTE — TELEPHONE ENCOUNTER
Pt wife Rose called in said received message that patient's script for Stelara was  - wants to make sure everything is OK - that he wont miss doses . She wanted to speak w/ Fatemeh BLUM But could not hold the line for me to try and reach her.

## 2025-02-10 ENCOUNTER — TELEPHONE (OUTPATIENT)
Age: 55
End: 2025-02-10

## 2025-02-10 NOTE — TELEPHONE ENCOUNTER
I called and spoke with the patient.  Patient states he received an automatic message from [facility] your coverage approval for Stelara is expiring.  Patient made aware for Stelara valid 12/23/24-12/23/25 .  Patient aware if he is unable to receive next Stelara shipment to contact our office.

## 2025-02-10 NOTE — TELEPHONE ENCOUNTER
Pt called back regarding 02/07 call about medication but while on hold put disconnected the call.

## 2025-04-17 NOTE — PROGRESS NOTES
[facility] Note     Patient Name: Annette Crabtree  Encounter Date: 02/10/22     Have you been cared for by a [facility] Dermatologist in the last 3 years and, if so, which one? No    · Have you traveled outside of the [address] [place] [address] in the past 3 months or outside of the [facility] in the last 2 weeks? No     May we call your Preferred Phone number to discuss your specific medical information? Yes     May we leave a detailed message that includes your specific medical information? Yes      Today's Chief Concerns:   Concern #1:  Skin check   Concern #2:  Skin tags    Past Medical History:  Have you personally ever had or currently have any of the following? · Skin cancer (such as Melanoma, Basal Cell Carcinoma, Squamous Cell Carcinoma? (If Yes, please provide more detail)- No  · Eczema: No  · Psoriasis: No  · HIV/AIDS: No  · Hepatitis B or C: No  · Tuberculosis: No  · Systemic Immunosuppression such as Diabetes, Biologic or Immunotherapy, Chemotherapy, Organ Transplantation, Bone Marrow Transplantation (If YES, please provide more detail): No  · Radiation Treatment (If YES, please provide more detail): No  · Any other major medical conditions/concerns? 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 Family History:  Have any of your "first degree relatives" (parent, brother, sister, or child) had any of the following       · Skin cancer such as Melanoma or Merkel Cell Carcinoma or Pancreatic Cancer? No  · Eczema, Asthma, Hay Fever or Seasonal Allergies: No  · Psoriasis or Psoriatic Arthritis: No  · Do any other medical conditions seem to run in your family? If Yes, what condition and which relatives?   YES, father has hx of hypertension    Current Medications:         Current Outpatient Medications:     sertraline (ZOLOFT) 50 mg tablet, Take 100 mg by mouth daily  , Disp: , Rfl:     Stelara 90 MG/ML subcutaneous injection, Inject 1 mL (90 mg total) under the skin every 28 days, Disp: 1 mL, Rfl: 6      Review of Systems:  Have you recently had or currently have any of the following? If YES, what are you doing for the problem? · Fever, chills or unintended weight loss: No  · Sudden loss or change in your vision: No  · Nausea, vomiting or blood in your stool: yes  · Painful or swollen joints: No  · Wheezing or cough: No  · Changing mole or non-healing wound: No  · Nosebleeds: No  · Excessive sweating: No  · Easy or prolonged bleeding? No  · Over the last 2 weeks, how often have you been bothered by the following problems? · Taking little interest or pleasure in doing things: 1 - Not at All  · Feeling down, depressed, or hopeless: 1 - Not at All  · Rapid heartbeat with epinephrine:  No    · Any known allergies? · No Known Allergies      Physical Exam:     Was a chaperone (Derm Clinical Assistant) present throughout the entire Physical Exam? Yes     Did the Dermatology Team specifically  the patient on the importance of a Full Skin Exam to be sure that nothing is missed clinically?  Yes}  o Did the patient ultimately request or accept a Full Skin Exam?  Yes  o     CONSTITUTIONAL:   Vitals:    02/10/22 1126   Temp: (!) 96 7 °F (35 9 °C)   TempSrc: Temporal   Weight: 128 kg (283 lb)   Height: 5' 10" (1 778 m)       PSYCH: Normal mood and affect  EYES: Normal conjunctiva  ENT: Normal lips and oral mucosa  CARDIOVASCULAR: No edema  RESPIRATORY: Normal respirations  HEME/LYMPH/IMMUNO:  No  Ostensible subQ swelling except as noted below in "ASSESSMENT AND PLAN BY DIAGNOSIS"    SKIN:  FULL ORGAN SYSTEM EXAM   Hair, Scalp, Ears, Face Normal except as noted below in Assessment   Neck,  Normal except as noted below in Assessment   Right Arm/Hand/Fingers Normal except as noted below in Assessment   Left Arm/Hand/Fingers Normal except as noted below in Assessment   Chest/Breasts/Axillae Viewed areas Normal except as noted below in Assessment   Abdomen, Umbilicus Normal except as noted below in Assessment   Back/Spine Normal except as noted below in Assessment   Groin, buttocks Normal except as noted below in Assessment   Right Leg, Foot, Toes Normal except as noted below in Assessment   Left Leg, Foot, Toes Normal except as noted below in Assessment        Assessment and Plan by Diagnosis:    History of Present Condition:     Duration:  How long has this been an issue for you? o  skin tags had for long time   Location Affected:  Where on the body is this affecting you?    o  groin area   Quality:  Is there any bleeding, pain, itch, burning/irritation, or redness associated with the skin lesion? o  irritation   Severity:  Describe any bleeding, pain, itch, burning/irritation, or redness on a scale of 1 to 10 (with 10 being the worst)  o  5   Timing:  Does this condition seem to be there pretty constantly or do you notice it more at specific times throughout the day? o  constant   Context:  Have you ever noticed that this condition seems to be associated with specific activities you do?    o  denies   Modifying Factors:    o Anything that seems to make the condition worse?    -  rubbing  o What have you tried to do to make the condition better?    -  denies   Associated Signs and Symptoms:  Does this skin lesion seem to be associated with any of the following:    CASILLAS ANGIOMAS    Physical Exam:   Anatomic Location Affected:  back   Morphological Description:  Scattered cherry red, 1-4 mm papules   Pertinent Positives:   Pertinent Negatives:     Additional History of Present Condition:  Discovered upon skin exam    Assessment and Plan:  Based on a thorough discussion of this condition and the management approach to it (including a comprehensive discussion of the known risks, side effects and potential benefits of treatment), the patient (family) agrees to implement the following specific plan:   Reassured benign    Assessment and Plan:    Cherry angioma, also known as Jansen Puff spots, are benign vascular skin lesions  A "cherry angioma" is a firm red, blue or purple papule, 0 1-1 cm in diameter  When thrombosed, they can appear black in colour until evaluated with a dermatoscope when the red or purple colour is more easily seen  Cherry angioma may develop on any part of the body but most often appear on the scalp, face, lips and trunk  An angioma is due to proliferating endothelial cells; these are the cells that line the inside of a blood vessel  Angiomas can arise in early life or later in life; the most common type of angioma is a cherry angioma  Cherry angiomas are very common in males and females of any age or race  They are more noticeable in white skin than in skin of colour  They markedly increase in number from about the age of 36  There may be a family history of similar lesions  Eruptive cherry angiomas have been rarely reported to be associated with internal malignancy  The cause of angiomas is unknown  Genetic analysis of cherry angiomas has shown that they frequently carry specific somatic missense mutations in the GNAQ and GNA11 (Q209H) genes, which are involved in other vascular and melanocytic proliferations  Cherry angioma is usually diagnosed clinically and no investigations are necessary for the majority of lesions  It has a characteristic red-clod or lobular pattern on dermatoscopy (called lacunar pattern using conventional pattern analysis)  When there is uncertainty about the diagnosis, a biopsy may be performed  The angioma is composed of venules in a thickened papillary dermis  Collagen bundles may be prominent between the lobules  Cherry angiomas are harmless, so they do not usually have to be treated   Occasionally, they are removed to exclude a malignant skin lesion such as a nodular melanoma or because they are irritated or bleeding (and a subsequent risk for infection)  To decrease friction over the lesions, we recommend Neutrogena Daily Defense SPF 50+ at least 3 times a day  MELANOCYTIC NEVI ("Moles")    Physical Exam:   Anatomic Location Affected:    trunk   Morphological Description:  Scattered, 1-4mm round to ovoid, symmetrical-appearing, even bordered, skin colored to dark brown macules/papules, mostly in sun-exposed areas   Pertinent Positives:   Pertinent Negatives: Additional History of Present Condition:  Present for years    Assessment and Plan:  Based on a thorough discussion of this condition and the management approach to it (including a comprehensive discussion of the known risks, side effects and potential benefits of treatment), the patient (family) agrees to implement the following specific plan:   Monitor for changes   Yearly skin checks     Melanocytic Nevi  Melanocytic nevi ("moles") are caused by collections of the color producing skin cells, or melanocytes, in 1 area in the skin  They can range in color from pink to dark brown and be either raised or flat  Some moles are present at birth (I e , "congenital nevi"), while others come up later in life (i e , "acquired nevi")  Mat Press exposure also stimulates the body to make more moles, ie the more sun you get the more moles you'll grow  Clinically distinguishing a healthy mole from melanoma may be difficult  The "ABCDE's" of moles have been suggested as a means of helping to alert a person to a suspicious mole and the possible increased risk of melanoma  Asymmetry: Healthy moles tend to be symmetric, while melanomas are often asymmetric    Asymmetry means if you draw a line through the mole, the two halves do not match in color, size, shape, or surface texture Any mole that starts to demonstrate "asymmetry" should be examined promptly by a board certified dermatologist      Ekaterina Jones: Healthy moles tend to have discrete, even borders  The border of a melanoma often blends into the normal skin and does not sharply delineate the mole from normal skin  Any mole that starts to demonstrate "uneven borders" should be examined promptly     Color: Healthy moles tend to be one color throughout  Melanomas tend to be made up of different colors ranging from dark black, blue, white, or red  Any mole that demonstrates a color change should be examined promptly    Diameter: Healthy moles tend to be smaller than 0 6 cm in size; an exception are "congenital nevi" that can be larger  Melanomas tend to grow and can often be greater than 0 6 cm (1/4 of an inch, or the size of a pencil eraser)  This is only a guideline, and many normal moles may be larger than 0 6 cm without being unhealthy  Any mole that starts to change in size (small to bigger or bigger to smaller) should be examined promptly    Evolving: Healthy moles tend to "stay the same "  Melanomas may often show signs of change or evolution such as a change in size, shape, color, or elevation  Any mole that starts to itch, bleed, crust, burn, hurt, or ulcerate or demonstrate a change or evolution should be examined promptly by a board certified dermatologist       What are atypical moles or dysplastic nevi? Dysplastic moles are moles that have some of the ABCDE  changes listed above but  are not cancerous  Sometimes a biopsy and microscopic examination are needed to determine the difference  They may indicate an increased risk of melanoma in that person, especially if there is a family history of melanoma  What is a Melanoma? The main concern when looking at a new or changing mole it to evaluate whether it may be a melanoma  The appearance of a "new mole" remains one of the most reliable methods for identifying a malignant melanoma  A melanoma is a type of skin cancer that can be deadly if it spreads throughout the body   The prognosis of a Melanoma depends on how deep it has penetrated in the skin  If caught early, they generally will not have had time to grow into the deeper layers of the skin and they cure rate is then very high  Once the melanoma grows deeper into the skin, the cure rate drops dramatically  Therefore, early detection and removal of a malignant melanoma results in a much better chance of complete cure  ACROCHORDON ("SKIN TAG")    Physical Exam:   Anatomic Location Affected:  Neck    Morphological Description:  Dark skin colored tag   Pertinent Positives:   Pertinent Negatives: Additional History of Present Condition: present for years    Assessment and Plan:  Based on a thorough discussion of this condition and the management approach to it (including a comprehensive discussion of the known risks, side effects and potential benefits of treatment), the patient (family) agrees to implement the following specific plan:   Reassured benign    Skin tags are common, soft, harmless skin lesions that are also called, in the appropriate settings, papillomas, fibroepithelial polyps, and soft fibromas  They are made up of loosely arranged collagen fibers and blood vessels surrounded by a thickened or thinned-out epidermis  Skin tags tend to develop in both men and women as we grow older  They are usually found on the skin folds (neck, armpits, groin)  It is not known what specifically causes skin tags  Certain factors, though, do appear to play a role:   Chaffing and irritation from skin rubbing together   High levels of growth factors (as seen, for example, in pregnancy or in acromegaly/gigantism)   Insulin resistance   Human papillomavirus (wart virus)    We discussed that most skin tags do not need to be treated unless they are specifically causing the patient physical distress or limitation or pose a risk for a larger problem such as an infection that forms secondary to excoriation or chronic irritation      We had a thorough discussion of treatment options and specific risks (including that any procedural treatment may not be covered by insurance and would then be the patient's responsibility) and benefits/alternatives including but not limited to the following:   Cryotherapy (freezing)   Shave removal   Surgical excision (snip excision with scissors)   Electrosurgery   Ligation (we do not do this procedure and counseled against it due to risk of tissue necrosis and infection)      NEOPLASM OF UNCERTAIN BEHAVIOR OF SKIN    Physical Exam:   (Anatomic Location); (Size and Morphological Description); (Differential Diagnosis):    SPECIMEN A: Anatomic Location: left thigh Skin; Procedure Type: Shave Removal  49y o  year old  Male with a Morphological Description: 0 5 x  05 cm pink papule; Differential diagnosis: Rule out Skin tag      Pertinent Positives:   Pertinent Negatives: Additional History of Present Condition:  Patient noticed growth in groin area that rubs    Assessment and Plan:   I have discussed with the patient that a sample of skin via a "skin biopsy would be potentially helpful to further make a specific diagnosis under the microscope   Based on a thorough discussion of this condition and the management approach to it (including a comprehensive discussion of the known risks, side effects and potential benefits of treatment), the patient (family) agrees to implement the following specific plan:    o Procedure:  Skin Biopsy  After a thorough discussion of treatment options and risk/benefits/alternatives (including but not limited to local pain, scarring, dyspigmentation, blistering, possible superinfection, and inability to confirm a diagnosis via histopathology), verbal and written consent were obtained and portion of the rash was biopsied for tissue sample  See below for consent that was obtained from patient and subsequent Procedure Note      PROCEDURE SHAVE BIOPSY NOTE:     Performing Physician: [facility]; Clinical Description with size (cm); Pre-Op Diagnosis:     SPECIMEN A: Anatomic Location: left thigh Skin; Procedure Type: Shave Removal  49y o  year old  Male with a Morphological Description: 0 5 x  05 cm pink papule; Differential diagnosis: Rule out Skin tag      Post-op diagnosis: Same      Local anesthesia: 1% xylocaine with epi       Topical anesthesia: None     Hemostasis: Aluminum chloride       After obtaining informed consent  at which time there was a discussion about the purpose of biopsy  and low risks of infection and bleeding  The area was prepped and draped in the usual fashion  Anesthesia was obtained with 1% lidocaine with epinephrine  A shave biopsy to an appropriate sampling depth was obtained with a sterile blade (such as a 15-blade or DermaBlade)  The resulting wound was covered with surgical ointment and bandaged appropriately  The patient tolerated the procedure well without complications and was without signs of functional compromise  Specimen has been sent for review by Dermatopathology  Standard post-procedure care has been explained and has been included in written form within the patient's copy of Informed Consent  INFORMED CONSENT DISCUSSION AND POST-OPERATIVE INSTRUCTIONS FOR PATIENT    I   RATIONALE FOR PROCEDURE  I understand that a skin biopsy allows the Dermatologist to test a lesion or rash under the microscope to obtain a diagnosis  It usually involves numbing the area with numbing medication and removing a small piece of skin; sometimes the area will be closed with sutures  In this specific procedure, sutures are not usually needed  If any sutures are placed, then they are usually need to be removed in 2 weeks or less  I understand that my Dermatologist recommends that a skin "shave" biopsy be performed today    A local anesthetic, similar to the kind that a dentist uses when filling a cavity, will be injected with a very small needle into the skin area to be sampled  The injected skin and tissue underneath "will go to sleep and become numb so no pain should be felt afterwards  An instrument shaped like a tiny "razor blade" (shave biopsy instrument) will be used to cut a small piece of tissue and skin from the area so that a sample of tissue can be taken and examined more closely under the microscope  A slight amount of bleeding will occur, but it will be stopped with direct pressure and a pressure bandage and any other appropriate methods  I understands that a scar will form where the wound was created  Surgical ointment will be applied to help protect the wound  Sutures are not usually needed  II   RISKS AND POTENTIAL COMPLICATIONS   I understand the risks and potential complications of a skin biopsy include but are not limited to the following:   Bleeding   Infection   Pain   Scar/keloid   Skin discoloration   Incomplete Removal   Recurrence   Nerve Damage/Numbness/Loss of Function   Allergic Reaction to Anesthesia   Biopsies are diagnostic procedures and based on findings additional treatment or evaluation may be required   Loss or destruction of specimen resulting in no additional findings    My Dermatologist has explained to me the nature of the condition, the nature of the procedure, and the benefits to be reasonably expected compared with alternative approaches  My Dermatologist has discussed the likelihood of major risks or complications of this procedure including the specific risks listed above, such as bleeding, infection, and scarring/keloid  I understand that a scar is expected after this procedure  I understand that my physician cannot predict if the scar will form a "keloid," which extends beyond the borders of the wound that is created  A keloid is a thick, painful, and bumpy scar    A keloid can be difficult to treat, as it does not always respond well to therapy, which includes injecting cortisone directly into the keloid every few weeks  While this usually reduces the pain and size of the scar, it does not eliminate it  I understand that photographs may be taken before and after the procedure  These will be maintained as part of the medical providers confidential records and may not be made available to me  I further authorize the medical provider to use the photographs for teaching purposes or to illustrate scientific papers, books, or lectures if in his/her judgment, medical research, education, or science may benefit from its use  I have had an opportunity to fully inquire about the risks and benefits of this procedure and its alternatives  I have been given ample time and opportunity to ask questions and to seek a second opinion if I wished to do so  I acknowledge that there have specifically been no guarantees as to the cosmetic results from the procedure  I am aware that with any procedure there is always the possibility of an unexpected complication  III  POST-PROCEDURAL CARE (WHAT YOU WILL NEED TO DO "AFTER THE BIOPSY" TO OPTIMIZE HEALING)     Keep the area clean and dry  Try NOT to remove the bandage or get it wet for the first 24 hours   Gently clean the area and apply surgical ointment (such as Vaseline petrolatum ointment, which is available "over the counter" and not a prescription) to the biopsy site for up to 2 weeks straight  This acts to protect the wound from the outside world   Sutures are not usually placed in this procedure  If any sutures were placed, return for suture removal as instructed (generally 1 week for the face, 2 weeks for the body)   Take Acetaminophen (Tylenol) for discomfort, if no contraindications  Ibuprofen or aspirin could make bleeding worse   Call our office immediately for signs of infection: fever, chills, increased redness, warmth, tenderness, discomfort/pain, or pus or foul smell coming from the wound      WHAT TO DO IF THERE IS ANY BLEEDING? If a small amount of bleeding is noticed, place a clean cloth over the area and apply firm pressure for ten minutes  Check the wound after 10 minutes of direct pressure  If bleeding persists, try one more time for an additional 10 minutes of direct pressure on the area  If the bleeding becomes heavier or does not stop after the second attempt, or if you have any other questions about this procedure, then please call your [facility] by calling [phone] (SKIN)  I hereby acknowledge that I have reviewed and verified the site with my Dermatologist and have requested and authorized my Dermatologist to proceed with the procedure        Scribe Attestation    I,:  Jasmyn Patel am acting as a scribe while in the presence of the attending physician :       I,:  Ta Ross MD personally performed the services described in this documentation    as scribed in my presence : b/l breast pain and pelvic pain x 1 week- denies hematuria- hx HTN

## 2025-04-21 ENCOUNTER — ANESTHESIA EVENT (OUTPATIENT)
Dept: ANESTHESIOLOGY | Facility: HOSPITAL | Age: 55
End: 2025-04-21

## 2025-04-21 ENCOUNTER — ANESTHESIA (OUTPATIENT)
Dept: ANESTHESIOLOGY | Facility: HOSPITAL | Age: 55
End: 2025-04-21

## 2025-04-28 ENCOUNTER — TELEPHONE (OUTPATIENT)
Age: 55
End: 2025-04-28

## 2025-04-28 NOTE — TELEPHONE ENCOUNTER
Confirming Upcoming Procedure: colonoscopy on 05/05/2025  Physician performing: Dr Jorge  Location of procedure:  [facility]   Prep: miralax prep     Spoke with patient no question at the moment

## 2025-05-02 RX ORDER — SODIUM CHLORIDE, SODIUM LACTATE, POTASSIUM CHLORIDE, CALCIUM CHLORIDE 600; 310; 30; 20 MG/100ML; MG/100ML; MG/100ML; MG/100ML
125 INJECTION, SOLUTION INTRAVENOUS CONTINUOUS
Status: CANCELLED | OUTPATIENT
Start: 2025-05-02

## 2025-05-04 ENCOUNTER — ANESTHESIA EVENT (OUTPATIENT)
Dept: ANESTHESIOLOGY | Facility: HOSPITAL | Age: 55
End: 2025-05-04

## 2025-05-04 ENCOUNTER — ANESTHESIA (OUTPATIENT)
Dept: ANESTHESIOLOGY | Facility: HOSPITAL | Age: 55
End: 2025-05-04

## 2025-05-04 NOTE — ANESTHESIA PREPROCEDURE EVALUATION
Procedure:  PRE-OP ONLY    Relevant Problems   ANESTHESIA (within normal limits)      CARDIO (within normal limits)      ENDO (within normal limits)      GI/HEPATIC (within normal limits)      /RENAL (within normal limits)      GYN (within normal limits)      HEMATOLOGY (within normal limits)      MUSCULOSKELETAL (within normal limits)      NEURO/PSYCH   (+) Anxiety about health      PULMONARY (within normal limits)             Anesthesia Plan  ASA Score- 1     Anesthesia Type- IV sedation with anesthesia with ASA Monitors.         Additional Monitors:     Airway Plan:            Plan Factors-Exercise tolerance (METS): >4 METS.    Chart reviewed.   Existing labs reviewed. Patient summary reviewed.    Patient is not a current smoker.              Induction- intravenous.    Postoperative Plan-         Informed Consent- Anesthetic plan and risks discussed with patient.        NPO Status:  No vitals data found for the desired time range.

## 2025-05-05 ENCOUNTER — HOSPITAL ENCOUNTER (OUTPATIENT)
Dept: GASTROENTEROLOGY | Facility: MEDICAL CENTER | Age: 55
Setting detail: OUTPATIENT SURGERY
Discharge: HOME | End: 2025-05-05
Attending: DIETITIAN, REGISTERED
Payer: COMMERCIAL

## 2025-05-05 ENCOUNTER — ANESTHESIA (OUTPATIENT)
Dept: GASTROENTEROLOGY | Facility: MEDICAL CENTER | Age: 55
End: 2025-05-05
Payer: COMMERCIAL

## 2025-05-05 ENCOUNTER — ANESTHESIA EVENT (OUTPATIENT)
Dept: GASTROENTEROLOGY | Facility: MEDICAL CENTER | Age: 55
End: 2025-05-05
Payer: COMMERCIAL

## 2025-05-05 VITALS
HEIGHT: 70 IN | SYSTOLIC BLOOD PRESSURE: 141 MMHG | OXYGEN SATURATION: 98 % | RESPIRATION RATE: 16 BRPM | BODY MASS INDEX: 38.94 KG/M2 | DIASTOLIC BLOOD PRESSURE: 67 MMHG | WEIGHT: 272 LBS | HEART RATE: 84 BPM | TEMPERATURE: 97.9 F

## 2025-05-05 DIAGNOSIS — K51.919 ULCERATIVE COLITIS WITH COMPLICATION, UNSPECIFIED LOCATION (HCC): ICD-10-CM

## 2025-05-05 DIAGNOSIS — D84.9 IMMUNOCOMPROMISED STATE (HCC): ICD-10-CM

## 2025-05-05 PROCEDURE — 88305 TISSUE EXAM BY PATHOLOGIST: CPT | Performed by: PATHOLOGY

## 2025-05-05 PROCEDURE — 88342 IMHCHEM/IMCYTCHM 1ST ANTB: CPT | Performed by: PATHOLOGY

## 2025-05-05 PROCEDURE — 45380 COLONOSCOPY AND BIOPSY: CPT | Performed by: INTERNAL MEDICINE

## 2025-05-05 PROCEDURE — 88341 IMHCHEM/IMCYTCHM EA ADD ANTB: CPT | Performed by: PATHOLOGY

## 2025-05-05 RX ORDER — SODIUM CHLORIDE, SODIUM LACTATE, POTASSIUM CHLORIDE, CALCIUM CHLORIDE 600; 310; 30; 20 MG/100ML; MG/100ML; MG/100ML; MG/100ML
125 INJECTION, SOLUTION INTRAVENOUS CONTINUOUS
Status: DISCONTINUED | OUTPATIENT
Start: 2025-05-05 | End: 2025-05-09 | Stop reason: HOSPADM

## 2025-05-05 RX ORDER — PROPOFOL 10 MG/ML
INJECTION, EMULSION INTRAVENOUS AS NEEDED
Status: DISCONTINUED | OUTPATIENT
Start: 2025-05-05 | End: 2025-05-05

## 2025-05-05 RX ORDER — LIDOCAINE HYDROCHLORIDE 20 MG/ML
INJECTION, SOLUTION EPIDURAL; INFILTRATION; INTRACAUDAL; PERINEURAL AS NEEDED
Status: DISCONTINUED | OUTPATIENT
Start: 2025-05-05 | End: 2025-05-05

## 2025-05-05 RX ADMIN — PROPOFOL 100 MG: 10 INJECTION, EMULSION INTRAVENOUS at 08:23

## 2025-05-05 RX ADMIN — PROPOFOL 50 MG: 10 INJECTION, EMULSION INTRAVENOUS at 08:27

## 2025-05-05 RX ADMIN — PROPOFOL 50 MG: 10 INJECTION, EMULSION INTRAVENOUS at 08:24

## 2025-05-05 RX ADMIN — SODIUM CHLORIDE, SODIUM LACTATE, POTASSIUM CHLORIDE, AND CALCIUM CHLORIDE: .6; .31; .03; .02 INJECTION, SOLUTION INTRAVENOUS at 07:57

## 2025-05-05 RX ADMIN — PROPOFOL 50 MG: 10 INJECTION, EMULSION INTRAVENOUS at 08:26

## 2025-05-05 RX ADMIN — LIDOCAINE HYDROCHLORIDE 50 MG: 20 INJECTION, SOLUTION EPIDURAL; INFILTRATION; INTRACAUDAL at 08:23

## 2025-05-05 RX ADMIN — PROPOFOL 150 MCG/KG/MIN: 10 INJECTION, EMULSION INTRAVENOUS at 08:25

## 2025-05-05 NOTE — ANESTHESIA POSTPROCEDURE EVALUATION
Post-Op Assessment Note    CV Status:  Stable    Pain management: adequate       Mental Status:  Alert and awake   Hydration Status:  Euvolemic   PONV Controlled:  Controlled   Airway Patency:  Patent     Post Op Vitals Reviewed: Yes    No anethesia notable event occurred.    Staff: Anesthesiologist, CRNA           Last Filed PACU Vitals:  Vitals Value Taken Time   Temp     Pulse 84 05/05/25 0859   /67 05/05/25 0859   Resp 16 05/05/25 0859   SpO2 98 % 05/05/25 0859       Modified Shamika:     Vitals Value Taken Time   Activity 2 05/05/25 0900   Respiration 2 05/05/25 0900   Circulation 2 05/05/25 0900   Consciousness 2 05/05/25 0900   Oxygen Saturation 2 05/05/25 0900     Modified Shamika Score: 10

## 2025-05-05 NOTE — H&P
"History and Physical -  [facility]  Marlon Bay 54 y.o. male MRN: [id number]                  HPI: Marlon Bay is a 54 y.o. year old male who presents for uc.      REVIEW OF SYSTEMS: Per the HPI, and otherwise unremarkable.    Historical Information   Past Medical History:   Diagnosis Date    Anxiety     Colon polyp     Pneumonia     Ulcerative colitis (HCC)     Vertigo      Past Surgical History:   Procedure Laterality Date    COLONOSCOPY N/A 2016    Procedure: COLONOSCOPY;  Surgeon: Karl Coyle MD;  Location: [facility];  Service:     EGD AND COLONOSCOPY N/A 2017    Procedure: EGD AND COLONOSCOPY;  Surgeon: Karl Coyle MD;  Location:  GI LAB;  Service: Gastroenterology    EYE SURGERY  20, 24- Patient reported    HERNIA REPAIR      as infant    NASAL SEPTUM SURGERY      [place] COLONOSCOPY FLX DX W/COLLJ SPEC WHEN PFRMD N/A 2019    Procedure: COLONOSCOPY;  Surgeon: Karl Coyle MD;  Location: [facility];  Service: Gastroenterology     Social History   Social History     Substance and Sexual Activity   Alcohol Use Yes    Comment: occasionally     Social History     Substance and Sexual Activity   Drug Use No     Social History     Tobacco Use   Smoking Status Former    Current packs/day: 0.00    Types: Cigarettes    Quit date: 3/29/2004    Years since quittin.1   Smokeless Tobacco Never     Family History   Problem Relation Age of Onset    Hypertension Father     Cancer Maternal Grandfather     Diabetes Paternal Grandmother     Hypertension Paternal Grandmother     Cancer Paternal Grandfather         Prostate    Hypertension Paternal Grandfather        Meds/Allergies     Not in a hospital admission.    No Known Allergies    Objective     Blood pressure 127/72, pulse 83, temperature 97.9 °F (36.6 °C), resp. rate 17, height 5' 10\" (1.778 m), weight 123 kg (272 lb), SpO2 97%.      PHYSICAL EXAMINATION:    General Appearance:   Alert, " cooperative, no distress   HEENT:  Normocephalic, atraumatic, anicteric. Neck supple, symmetrical, trachea midline.   Lungs:   Equal chest rise and unlabored breathing, normal effort, no coughing.   Cardiovascular:   No visualized JVD.   Abdomen:   No abdominal distension.   Skin:   No jaundice, rashes, or lesions.    Musculoskeletal:   Normal range of motion visualized.   Psych:  Normal affect and normal insight.   Neuro:  Alert and appropriate.           ASSESSMENT/PLAN:  This is a 54 y.o. year old male here for COLONOSCOPY, and he is stable and optimized for his procedure.

## 2025-05-05 NOTE — ANESTHESIA PREPROCEDURE EVALUATION
Procedure:  COLONOSCOPY    Relevant Problems   ANESTHESIA (within normal limits)      CARDIO (within normal limits)      ENDO (within normal limits)      GI/HEPATIC (within normal limits)  Inflammatory bowel disease      /RENAL (within normal limits)      GYN (within normal limits)      HEMATOLOGY (within normal limits)      MUSCULOSKELETAL (within normal limits)      NEURO/PSYCH   (+) Anxiety about health      PULMONARY (within normal limits)        Physical Exam    Airway    Mallampati score: III  TM Distance: >3 FB  Neck ROM: full     Dental   No notable dental hx     Cardiovascular  Rhythm: regular, Rate: normal, Cardiovascular exam normal    Pulmonary  Pulmonary exam normal Breath sounds clear to auscultation    Other Findings        Anesthesia Plan  ASA Score- 2     Anesthesia Type- IV sedation with anesthesia with ASA Monitors.         Additional Monitors:     Airway Plan:            Plan Factors-Exercise tolerance (METS): >4 METS.    Chart reviewed.   Existing labs reviewed. Patient summary reviewed.    Patient is not a current smoker.              Induction- intravenous.    Postoperative Plan-         Informed Consent- Anesthetic plan and risks discussed with patient.        NPO Status:  No vitals data found for the desired time range.

## 2025-05-09 ENCOUNTER — RESULTS FOLLOW-UP (OUTPATIENT)
Dept: GASTROENTEROLOGY | Facility: CLINIC | Age: 55
End: 2025-05-09

## 2025-05-09 PROCEDURE — 88342 IMHCHEM/IMCYTCHM 1ST ANTB: CPT | Performed by: PATHOLOGY

## 2025-05-09 PROCEDURE — 88341 IMHCHEM/IMCYTCHM EA ADD ANTB: CPT | Performed by: PATHOLOGY

## 2025-05-09 PROCEDURE — 88305 TISSUE EXAM BY PATHOLOGIST: CPT | Performed by: PATHOLOGY

## 2025-08-11 ENCOUNTER — OFFICE VISIT (OUTPATIENT)
Age: 55
End: 2025-08-11
Payer: COMMERCIAL